# Patient Record
Sex: MALE | Race: OTHER | HISPANIC OR LATINO | URBAN - METROPOLITAN AREA
[De-identification: names, ages, dates, MRNs, and addresses within clinical notes are randomized per-mention and may not be internally consistent; named-entity substitution may affect disease eponyms.]

---

## 2019-08-13 ENCOUNTER — EMERGENCY (EMERGENCY)
Facility: HOSPITAL | Age: 39
LOS: 1 days | Discharge: HOME | End: 2019-08-13
Attending: EMERGENCY MEDICINE | Admitting: EMERGENCY MEDICINE
Payer: MEDICAID

## 2019-08-13 VITALS
SYSTOLIC BLOOD PRESSURE: 149 MMHG | OXYGEN SATURATION: 99 % | RESPIRATION RATE: 16 BRPM | DIASTOLIC BLOOD PRESSURE: 83 MMHG | HEART RATE: 84 BPM | TEMPERATURE: 98 F

## 2019-08-13 PROCEDURE — 99283 EMERGENCY DEPT VISIT LOW MDM: CPT

## 2019-08-13 PROCEDURE — 73140 X-RAY EXAM OF FINGER(S): CPT | Mod: 26,LT

## 2019-08-13 NOTE — ED ADULT TRIAGE NOTE - CHIEF COMPLAINT QUOTE
Patient c/o laceration to left 2nd finger. Patient reports he cut the finger while using a wood saw. Denies any blood thinners. Denies any PMHx. Patient last took advil around 8pm.

## 2019-08-13 NOTE — ED PROVIDER NOTE - OBJECTIVE STATEMENT
40 y/o male no PMH presents to ED c/o left finger laceration. Pt. is [place] speaking - pacific  #[id number] - states was at work at 5pm - cutting wood with machine/saw when it slipped causing laceration/injury to pad of left index finer. Pt. c/o pain and bleeding to area. Denies numbness tingling swelling.   States up to date on all vaccines. 38 y/o male no PMH presents to ED c/o left finger laceration. Pt. is American speaking - pacific  #[id number] - states was at work at 5pm - cutting wood with machine/saw when it slipped causing laceration/injury to pad of left index finer. Pt. c/o pain and bleeding to area. Denies numbness tingling swelling.   States up to date on all vaccines.    Attendinyo male presents with lac to the fingerpad of the left index finger with saw at work

## 2019-08-13 NOTE — ED PROVIDER NOTE - CLINICAL SUMMARY MEDICAL DECISION MAKING FREE TEXT BOX
40 y/o male c/o left 2nd digit laceration  -xray - r/o bony injury  -possible sutures vs local wound care

## 2019-08-13 NOTE — ED PROVIDER NOTE - PHYSICAL EXAMINATION
Gen: Well appearing in NAD  Head: NC/AT  Neck: trachea midline  Resp:  No distress  Ext: no deformities  Neuro:  A&O appears non focal  Skin:  see below  Psych:  Normal affect and mood     left index finger: +laceration/skin avulsion with central skin flap noted and superficial surrounding abrased skin

## 2020-04-04 ENCOUNTER — EMERGENCY (EMERGENCY)
Facility: HOSPITAL | Age: 40
LOS: 1 days | Discharge: HOME | End: 2020-04-04
Attending: EMERGENCY MEDICINE | Admitting: EMERGENCY MEDICINE
Payer: MEDICAID

## 2020-04-04 VITALS
OXYGEN SATURATION: 94 % | SYSTOLIC BLOOD PRESSURE: 152 MMHG | TEMPERATURE: 100 F | DIASTOLIC BLOOD PRESSURE: 94 MMHG | HEART RATE: 116 BPM | RESPIRATION RATE: 24 BRPM

## 2020-04-04 PROBLEM — Z78.9 OTHER SPECIFIED HEALTH STATUS: Chronic | Status: ACTIVE | Noted: 2019-08-13

## 2020-04-04 PROCEDURE — 99284 EMERGENCY DEPT VISIT MOD MDM: CPT

## 2020-04-04 RX ORDER — ALBUTEROL 90 UG/1
1 AEROSOL, METERED ORAL ONCE
Refills: 0 | Status: COMPLETED | OUTPATIENT
Start: 2020-04-04 | End: 2020-04-04

## 2020-04-04 RX ORDER — ALBUTEROL 90 UG/1
1 AEROSOL, METERED ORAL ONCE
Refills: 0 | Status: COMPLETED | OUTPATIENT
Start: 2020-04-04 | End: 2021-03-03

## 2020-04-04 RX ORDER — ACETAMINOPHEN 500 MG
975 TABLET ORAL ONCE
Refills: 0 | Status: COMPLETED | OUTPATIENT
Start: 2020-04-04 | End: 2020-04-04

## 2020-04-04 RX ADMIN — ALBUTEROL 1 PUFF(S): 90 AEROSOL, METERED ORAL at 13:39

## 2020-04-04 RX ADMIN — Medication 975 MILLIGRAM(S): at 13:38

## 2020-04-04 NOTE — ED PROVIDER NOTE - CLINICAL SUMMARY MEDICAL DECISION MAKING FREE TEXT BOX
41 y/o M with COVID-19 like symptoms. Speaking full sentences, no hypoxia will DC home with albuterol and outpatient follow up.

## 2020-04-04 NOTE — ED PROVIDER NOTE - PATIENT PORTAL LINK FT
You can access the FollowMyHealth Patient Portal offered by [facility] by registering at the following website: [email or web address]. By joining [facility]’s FollowMyHealth portal, you will also be able to view your health information using other applications (apps) compatible with our system.

## 2020-04-04 NOTE — ED PROVIDER NOTE - OBJECTIVE STATEMENT
39 y/o M with no significant PMHx presents to the ED c/o 5 days of fever. Taking Tylenol every 6 hours with relief to fevers. Denies SOB but admits to chest pressure. Mother with similar symptoms who has been sick for one and longer than him. Speaking full sentences. Nonsmoker, nondrinker.

## 2020-04-04 NOTE — ED ADULT TRIAGE NOTE - CHIEF COMPLAINT QUOTE
Complaining of fevers x 1 week. Daughter states he has been taking Tylenol every 6 hours. Last night began having sob so decided to go to hospital.

## 2020-04-08 ENCOUNTER — INPATIENT (INPATIENT)
Facility: HOSPITAL | Age: 40
LOS: 20 days | Discharge: HOME | End: 2020-04-29
Attending: INTERNAL MEDICINE | Admitting: INTERNAL MEDICINE
Payer: MEDICAID

## 2020-04-08 VITALS
RESPIRATION RATE: 20 BRPM | DIASTOLIC BLOOD PRESSURE: 90 MMHG | TEMPERATURE: 98 F | HEART RATE: 125 BPM | SYSTOLIC BLOOD PRESSURE: 144 MMHG | OXYGEN SATURATION: 84 %

## 2020-04-08 RX ORDER — ACETAMINOPHEN 500 MG
650 TABLET ORAL ONCE
Refills: 0 | Status: COMPLETED | OUTPATIENT
Start: 2020-04-08 | End: 2020-04-08

## 2020-04-08 RX ORDER — ALBUTEROL 90 UG/1
2 AEROSOL, METERED ORAL EVERY 6 HOURS
Refills: 0 | Status: DISCONTINUED | OUTPATIENT
Start: 2020-04-08 | End: 2020-04-10

## 2020-04-08 RX ADMIN — Medication 650 MILLIGRAM(S): at 23:47

## 2020-04-08 RX ADMIN — ALBUTEROL 2 PUFF(S): 90 AEROSOL, METERED ORAL at 23:47

## 2020-04-08 NOTE — ED PROVIDER NOTE - CARE PLAN
Principal Discharge DX:	SOB (shortness of breath)  Secondary Diagnosis:	Fever  Secondary Diagnosis:	Cough

## 2020-04-08 NOTE — ED PROVIDER NOTE - OBJECTIVE STATEMENT
[id number]:  40M no PMH p/w Fever cough and shortness of breath for several days.  Was seen 4 days ago for similar and was able to be d/c.  In total has 8 days of symptoms. SpO2 at home was 76 per triage note.  Here despite 10LNC and NRB pt satting low 80's.  However pt proned and satting mid 90's.  Patient denies chest pain, abd pain, N/V/D/C, weakness, HA, dizziness, urinary symptoms, extremity pain or swelling or other complaints. Not travel or sick contact.

## 2020-04-08 NOTE — ED PROVIDER NOTE - CLINICAL SUMMARY MEDICAL DECISION MAKING FREE TEXT BOX
40M no PMH p/w Fever cough and shortness of breath for several days.  Was seen 4 days ago for similar and was able to be d/c.  In total has 8 days of symptoms. SpO2 at home was 76 per triage note.  Here despite 10LNC and NRB pt satting low 80's.  However pt proned and satting mid 90's.  Work of breathing okay. Able to speak.  Normal mental status.  Likely coronavirus.  Admit given hypoxia.

## 2020-04-08 NOTE — ED PROVIDER NOTE - PHYSICAL EXAMINATION
GEN: ,well nourished, in no apparent distress.  HEAD: NCAT  HEENT: PERRL, Airway patent, EOMI, non-erythematous pharynx, no exudates, uvula midline, MMM, neck supple, no LAD, no JVD  LUNG: CTAB, +retractions +98% on supp O2 and proning   CV: RRR, no murmurs,   Abd: soft, NTND, no rebound or guarding, BS+ in all quadrants, no CVAT  MSK: WWP, Pulses 2+ in extremities, No edema   Neuro:  AAOx3, Ambulatory with stable gait.  Skin: Warm and dry, no evidence of rash  Psych: normal mood and affect

## 2020-04-08 NOTE — ED PROVIDER NOTE - ATTENDING CONTRIBUTION TO CARE
40M p/w Fever cough and short of breath for several days.  Was seen 4 days ago for similar and was able to be d/c.  So here 8 days of sx.  SpO2 at home was 76 per triage note.  Here despite 10LNC and NRB pt satting low 80's.  However pt proned and satting mid 90's.  Work of breathing okay. Able to speak.  Normal mental status.  Likely coronavirus.  Admit given hypoxia.  PMHX none PSHX - denies  meds - none No T.  All - NKA. 40M p/w Fever cough and short of breath for several days.  Was seen 4 days ago for similar and was able to be d/c.  So here 8 days of sx.  SpO2 at home was 76 per triage note.  Here despite 10LNC and NRB pt satting low 80's.  However pt proned and satting mid 90's.  Work of breathing okay. Able to speak.  Normal mental status.  Likely coronavirus.  Admit given hypoxia.  PMHX none PSHX - denies  meds - none No T.  All - NKA.  VS:  hypoxia, tachycardia    GEN - mod resp distress, malaise;   A+O x3   HEAD - NC/AT     ENT - PEERL, EOMI, mucous membranes   dry , no discharge      NECK: Neck supple, non-tender without lymphadenopathy, no masses, no JVD  PULM - fine crackles bilat, symmetric breath sounds  COR -  normal heart sounds    ABD - , ND, NT, soft,  BACK - no CVA tenderness, nontender spine     EXTREMS - no edema, no deformity, warm and well perfused    SKIN - no rash    or bruising      NEUROLOGIC - alert, face symmetric, speech fluent, sensation nl, motor no focal deficit.

## 2020-04-08 NOTE — ED PROVIDER NOTE - MDM PATIENT STATEMENT FOR ADDL TREATMENT
Patient with one or more new problems requiring additional work-up/treatment.
Class I (easy) - visualization of the soft palate, fauces, uvula, and both anterior and posterior pillars

## 2020-04-08 NOTE — ED ADULT TRIAGE NOTE - CHIEF COMPLAINT QUOTE
Pt brought in by daughter who stated he was in the ER two days ago for flu like symptoms, was sent home, no swab done. She states he has continued to decompensate at home. States O2 at home was 76%. Pt was 76% RA here. 84% on 15L nonrebreather. Pt does not appear to be in respiratory distress. Charge RN notified and brought directly to 28

## 2020-04-09 DIAGNOSIS — J96.01 ACUTE RESPIRATORY FAILURE WITH HYPOXIA: ICD-10-CM

## 2020-04-09 DIAGNOSIS — R06.03 ACUTE RESPIRATORY DISTRESS: ICD-10-CM

## 2020-04-09 DIAGNOSIS — Z02.9 ENCOUNTER FOR ADMINISTRATIVE EXAMINATIONS, UNSPECIFIED: ICD-10-CM

## 2020-04-09 LAB
ALBUMIN SERPL ELPH-MCNC: 3.1 G/DL — LOW (ref 3.3–5)
ALP SERPL-CCNC: 143 U/L — HIGH (ref 40–120)
ALT FLD-CCNC: 117 U/L — HIGH (ref 4–41)
ANION GAP SERPL CALC-SCNC: 13 MMO/L — SIGNIFICANT CHANGE UP (ref 7–14)
ANISOCYTOSIS BLD QL: SLIGHT — SIGNIFICANT CHANGE UP
AST SERPL-CCNC: 64 U/L — HIGH (ref 4–40)
BASE EXCESS BLDA CALC-SCNC: 5.3 MMOL/L — SIGNIFICANT CHANGE UP
BASOPHILS # BLD AUTO: 0.2 K/UL — SIGNIFICANT CHANGE UP (ref 0–0.2)
BASOPHILS NFR BLD AUTO: 1.1 % — SIGNIFICANT CHANGE UP (ref 0–2)
BASOPHILS NFR SPEC: 0 % — SIGNIFICANT CHANGE UP (ref 0–2)
BILIRUB SERPL-MCNC: 0.7 MG/DL — SIGNIFICANT CHANGE UP (ref 0.2–1.2)
BLASTS # FLD: 0 % — SIGNIFICANT CHANGE UP (ref 0–0)
BLOOD GAS ARTERIAL - FIO2: 100 — SIGNIFICANT CHANGE UP
BUN SERPL-MCNC: 15 MG/DL — SIGNIFICANT CHANGE UP (ref 7–23)
CALCIUM SERPL-MCNC: 8.6 MG/DL — SIGNIFICANT CHANGE UP (ref 8.4–10.5)
CHLORIDE SERPL-SCNC: 91 MMOL/L — LOW (ref 98–107)
CO2 SERPL-SCNC: 26 MMOL/L — SIGNIFICANT CHANGE UP (ref 22–31)
CREAT SERPL-MCNC: 0.89 MG/DL — SIGNIFICANT CHANGE UP (ref 0.5–1.3)
CRP SERPL HS-MCNC: 222.8 MG/L — SIGNIFICANT CHANGE UP
D DIMER BLD IA.RAPID-MCNC: 623 NG/ML — SIGNIFICANT CHANGE UP
EOSINOPHIL # BLD AUTO: 0.07 K/UL — SIGNIFICANT CHANGE UP (ref 0–0.5)
EOSINOPHIL NFR BLD AUTO: 0.4 % — SIGNIFICANT CHANGE UP (ref 0–6)
EOSINOPHIL NFR FLD: 0 % — SIGNIFICANT CHANGE UP (ref 0–6)
FERRITIN SERPL-MCNC: 2391 NG/ML — HIGH (ref 30–400)
GIANT PLATELETS BLD QL SMEAR: PRESENT — SIGNIFICANT CHANGE UP
GLUCOSE SERPL-MCNC: 96 MG/DL — SIGNIFICANT CHANGE UP (ref 70–99)
HCO3 BLDA-SCNC: 29 MMOL/L — HIGH (ref 22–26)
HCT VFR BLD CALC: 47.8 % — SIGNIFICANT CHANGE UP (ref 39–50)
HGB BLD-MCNC: 16.3 G/DL — SIGNIFICANT CHANGE UP (ref 13–17)
IMM GRANULOCYTES NFR BLD AUTO: 8 % — HIGH (ref 0–1.5)
LYMPHOCYTES # BLD AUTO: 1.99 K/UL — SIGNIFICANT CHANGE UP (ref 1–3.3)
LYMPHOCYTES # BLD AUTO: 11.4 % — LOW (ref 13–44)
LYMPHOCYTES NFR SPEC AUTO: 2.9 % — LOW (ref 13–44)
MCHC RBC-ENTMCNC: 29.3 PG — SIGNIFICANT CHANGE UP (ref 27–34)
MCHC RBC-ENTMCNC: 34.1 % — SIGNIFICANT CHANGE UP (ref 32–36)
MCV RBC AUTO: 86 FL — SIGNIFICANT CHANGE UP (ref 80–100)
METAMYELOCYTES # FLD: 0 % — SIGNIFICANT CHANGE UP (ref 0–1)
MONOCYTES # BLD AUTO: 1.37 K/UL — HIGH (ref 0–0.9)
MONOCYTES NFR BLD AUTO: 7.8 % — SIGNIFICANT CHANGE UP (ref 2–14)
MONOCYTES NFR BLD: 3.8 % — SIGNIFICANT CHANGE UP (ref 2–9)
MYELOCYTES NFR BLD: 0 % — SIGNIFICANT CHANGE UP (ref 0–0)
NEUTROPHIL AB SER-ACNC: 87.6 % — HIGH (ref 43–77)
NEUTROPHILS # BLD AUTO: 12.44 K/UL — HIGH (ref 1.8–7.4)
NEUTROPHILS NFR BLD AUTO: 71.3 % — SIGNIFICANT CHANGE UP (ref 43–77)
NEUTS BAND # BLD: 4.8 % — SIGNIFICANT CHANGE UP (ref 0–6)
NRBC # FLD: 0.13 K/UL — SIGNIFICANT CHANGE UP (ref 0–0)
OTHER - HEMATOLOGY %: 0 — SIGNIFICANT CHANGE UP
PCO2 BLDA: 40 MMHG — SIGNIFICANT CHANGE UP (ref 35–48)
PH BLDA: 7.48 PH — HIGH (ref 7.35–7.45)
PLATELET # BLD AUTO: 461 K/UL — HIGH (ref 150–400)
PLATELET COUNT - ESTIMATE: NORMAL — SIGNIFICANT CHANGE UP
PMV BLD: 9.9 FL — SIGNIFICANT CHANGE UP (ref 7–13)
PO2 BLDA: 58 MMHG — LOW (ref 83–108)
POTASSIUM SERPL-MCNC: 4.3 MMOL/L — SIGNIFICANT CHANGE UP (ref 3.5–5.3)
POTASSIUM SERPL-SCNC: 4.3 MMOL/L — SIGNIFICANT CHANGE UP (ref 3.5–5.3)
PROMYELOCYTES # FLD: 0 % — SIGNIFICANT CHANGE UP (ref 0–0)
PROT SERPL-MCNC: 7.2 G/DL — SIGNIFICANT CHANGE UP (ref 6–8.3)
RBC # BLD: 5.56 M/UL — SIGNIFICANT CHANGE UP (ref 4.2–5.8)
RBC # FLD: 13.3 % — SIGNIFICANT CHANGE UP (ref 10.3–14.5)
SAO2 % BLDA: 91.2 % — LOW (ref 95–99)
SARS-COV-2 RNA SPEC QL NAA+PROBE: DETECTED
SMUDGE CELLS # BLD: PRESENT — SIGNIFICANT CHANGE UP
SODIUM SERPL-SCNC: 130 MMOL/L — LOW (ref 135–145)
TROPONIN T, HIGH SENSITIVITY: 7 NG/L — SIGNIFICANT CHANGE UP (ref ?–14)
VARIANT LYMPHS # BLD: 0 % — SIGNIFICANT CHANGE UP
WBC # BLD: 17.46 K/UL — HIGH (ref 3.8–10.5)
WBC # FLD AUTO: 17.46 K/UL — HIGH (ref 3.8–10.5)

## 2020-04-09 PROCEDURE — 93010 ELECTROCARDIOGRAM REPORT: CPT

## 2020-04-09 PROCEDURE — 71045 X-RAY EXAM CHEST 1 VIEW: CPT | Mod: 26

## 2020-04-09 PROCEDURE — 99233 SBSQ HOSP IP/OBS HIGH 50: CPT

## 2020-04-09 RX ORDER — ANAKINRA 100MG/0.67
100 SYRINGE (ML) SUBCUTANEOUS EVERY 6 HOURS
Refills: 0 | Status: DISCONTINUED | OUTPATIENT
Start: 2020-04-09 | End: 2020-04-09

## 2020-04-09 RX ORDER — HYDROXYCHLOROQUINE SULFATE 200 MG
200 TABLET ORAL EVERY 12 HOURS
Refills: 0 | Status: COMPLETED | OUTPATIENT
Start: 2020-04-10 | End: 2020-04-13

## 2020-04-09 RX ORDER — ENOXAPARIN SODIUM 100 MG/ML
40 INJECTION SUBCUTANEOUS
Refills: 0 | Status: DISCONTINUED | OUTPATIENT
Start: 2020-04-09 | End: 2020-04-20

## 2020-04-09 RX ORDER — ACETAMINOPHEN 500 MG
650 TABLET ORAL EVERY 4 HOURS
Refills: 0 | Status: DISCONTINUED | OUTPATIENT
Start: 2020-04-09 | End: 2020-04-29

## 2020-04-09 RX ORDER — HYDROXYCHLOROQUINE SULFATE 200 MG
400 TABLET ORAL EVERY 12 HOURS
Refills: 0 | Status: COMPLETED | OUTPATIENT
Start: 2020-04-09 | End: 2020-04-09

## 2020-04-09 RX ORDER — ANAKINRA 100MG/0.67
100 SYRINGE (ML) SUBCUTANEOUS EVERY 6 HOURS
Refills: 0 | Status: COMPLETED | OUTPATIENT
Start: 2020-04-09 | End: 2020-04-12

## 2020-04-09 RX ORDER — HYDROXYCHLOROQUINE SULFATE 200 MG
TABLET ORAL
Refills: 0 | Status: COMPLETED | OUTPATIENT
Start: 2020-04-09 | End: 2020-04-13

## 2020-04-09 RX ORDER — ENOXAPARIN SODIUM 100 MG/ML
40 INJECTION SUBCUTANEOUS DAILY
Refills: 0 | Status: DISCONTINUED | OUTPATIENT
Start: 2020-04-09 | End: 2020-04-09

## 2020-04-09 RX ADMIN — Medication 400 MILLIGRAM(S): at 08:41

## 2020-04-09 RX ADMIN — Medication 100 MILLIGRAM(S): at 01:16

## 2020-04-09 RX ADMIN — Medication 40 MILLIGRAM(S): at 17:29

## 2020-04-09 RX ADMIN — Medication 40 MILLIGRAM(S): at 01:16

## 2020-04-09 RX ADMIN — Medication 400 MILLIGRAM(S): at 17:30

## 2020-04-09 RX ADMIN — Medication 100 MILLIGRAM(S): at 17:30

## 2020-04-09 RX ADMIN — Medication 100 MILLIGRAM(S): at 13:14

## 2020-04-09 RX ADMIN — ENOXAPARIN SODIUM 40 MILLIGRAM(S): 100 INJECTION SUBCUTANEOUS at 13:14

## 2020-04-09 NOTE — H&P ADULT - ASSESSMENT
41 y/o M with no pmh p/w worsening dyspnea, cough, and fever with Hypoxia and findings suspicious for viral pneumonia on imaging.

## 2020-04-09 NOTE — PROGRESS NOTE ADULT - SUBJECTIVE AND OBJECTIVE BOX
[facility] Division of Hospital Medicine  Cheryl Montes MD  Pager (M-F, 8A-5P):  In-house pager [phone]; Long-range pager [phone]  Other Times:  Please page Hospitalist in Charge -  In-house pager [phone]    Patient is a 40y old  Male who presents with a chief complaint of Dyspnea (09 Apr 2020 06:33)      SUBJECTIVE / OVERNIGHT EVENTS:  On max NC and NRB.    ADDITIONAL REVIEW OF SYSTEMS:    MEDICATIONS  (STANDING):  anakinra Injectable 100 milliGRAM(s) SubCutaneous every 6 hours  enoxaparin Injectable 40 milliGRAM(s) SubCutaneous daily  hydroxychloroquine   Oral   hydroxychloroquine 400 milliGRAM(s) Oral every 12 hours  methylPREDNISolone sodium succinate Injectable 40 milliGRAM(s) IV Push two times a day    MEDICATIONS  (PRN):  acetaminophen   Tablet .. 650 milliGRAM(s) Oral every 4 hours PRN Temp greater or equal to 38.5C (101.3F)  ALBUTerol    90 MICROgram(s) HFA Inhaler 2 Puff(s) Inhalation every 6 hours PRN Shortness of Breath  benzonatate 100 milliGRAM(s) Oral three times a day PRN Cough      CAPILLARY BLOOD GLUCOSE        I&O's Summary      PHYSICAL EXAM:  Vital Signs Last 24 Hrs  T(C): 36.8 (09 Apr 2020 08:20), Max: 37.4 (09 Apr 2020 06:11)  T(F): 98.2 (09 Apr 2020 08:20), Max: 99.3 (09 Apr 2020 06:11)  HR: 84 (09 Apr 2020 08:20) (84 - 125)  BP: 127/83 (09 Apr 2020 08:20) (127/83 - 148/84)  BP(mean): --  RR: 30 (09 Apr 2020 08:20) (20 - 38)  SpO2: 90% (09 Apr 2020 08:20) (84% - 97%)    CONSTITUTIONAL: resting in bed, NRB  EYES: PERRLA; conjunctiva and sclera clear  RESPIRATORY: relatively clear  CARDIOVASCULAR: Regular rate and rhythm, normal S1 and S2  ABDOMEN: Nontender to palpation, normoactive bowel sounds, no rebound/guarding  MUSCLOSKELETAL:  Normal gait; no clubbing or cyanosis of digits; no joint swelling or tenderness to palpation  PSYCH: A+O to person, place, and time; affect appropriate  NEUROLOGY: CN 2-12 are intact and symmetric; no gross sensory deficits;   SKIN: No rashes; no palpable lesions    LABS:                        16.3   17.46 )-----------( 461      ( 08 Apr 2020 23:47 )             47.8     04-08    130<L>  |  91<L>  |  15  ----------------------------<  96  4.3   |  26  |  0.89    Ca    8.6      08 Apr 2020 23:30    TPro  7.2  /  Alb  3.1<L>  /  TBili  0.7  /  DBili  x   /  AST  64<H>  /  ALT  117<H>  /  AlkPhos  143<H>  04-08                RADIOLOGY & ADDITIONAL TESTS:  Results Reviewed:   Imaging Personally Reviewed:  Electrocardiogram Personally Reviewed:    COORDINATION OF CARE:  Care Discussed with Consultants/Other Providers [Y/N]: RN, SW, CM, ACP re overall care   Prior or Outpatient Records Reviewed [Y/N]: [facility] Division of Hospital Medicine  Cheryl Montes MD  Pager (M-F, 8A-5P):  In-house pager [phone]; Long-range pager [phone]  Other Times:  Please page Hospitalist in Charge -  In-house pager [phone]    Patient is a 40y old  Male who presents with a chief complaint of Dyspnea (09 Apr 2020 06:33)      SUBJECTIVE / OVERNIGHT EVENTS:  On max NC and NRB, RRT called, proned/chest PT, sats improved to 90s.    ADDITIONAL REVIEW OF SYSTEMS:    MEDICATIONS  (STANDING):  anakinra Injectable 100 milliGRAM(s) SubCutaneous every 6 hours  enoxaparin Injectable 40 milliGRAM(s) SubCutaneous daily  hydroxychloroquine   Oral   hydroxychloroquine 400 milliGRAM(s) Oral every 12 hours  methylPREDNISolone sodium succinate Injectable 40 milliGRAM(s) IV Push two times a day    MEDICATIONS  (PRN):  acetaminophen   Tablet .. 650 milliGRAM(s) Oral every 4 hours PRN Temp greater or equal to 38.5C (101.3F)  ALBUTerol    90 MICROgram(s) HFA Inhaler 2 Puff(s) Inhalation every 6 hours PRN Shortness of Breath  benzonatate 100 milliGRAM(s) Oral three times a day PRN Cough      CAPILLARY BLOOD GLUCOSE        I&O's Summary      PHYSICAL EXAM:  Vital Signs Last 24 Hrs  T(C): 36.8 (09 Apr 2020 08:20), Max: 37.4 (09 Apr 2020 06:11)  T(F): 98.2 (09 Apr 2020 08:20), Max: 99.3 (09 Apr 2020 06:11)  HR: 84 (09 Apr 2020 08:20) (84 - 125)  BP: 127/83 (09 Apr 2020 08:20) (127/83 - 148/84)  BP(mean): --  RR: 30 (09 Apr 2020 08:20) (20 - 38)  SpO2: 90% (09 Apr 2020 08:20) (84% - 97%)    CONSTITUTIONAL: resting in bed, NRB  EYES: PERRLA; conjunctiva and sclera clear  RESPIRATORY: relatively clear  CARDIOVASCULAR: Regular rate and rhythm, normal S1 and S2  ABDOMEN: Nontender to palpation, normoactive bowel sounds, no rebound/guarding  MUSCLOSKELETAL:  Normal gait; no clubbing or cyanosis of digits; no joint swelling or tenderness to palpation  PSYCH: A+O to person, place, and time; affect appropriate  NEUROLOGY: CN 2-12 are intact and symmetric; no gross sensory deficits;   SKIN: No rashes; no palpable lesions    LABS:                        16.3   17.46 )-----------( 461      ( 08 Apr 2020 23:47 )             47.8     04-08    130<L>  |  91<L>  |  15  ----------------------------<  96  4.3   |  26  |  0.89    Ca    8.6      08 Apr 2020 23:30    TPro  7.2  /  Alb  3.1<L>  /  TBili  0.7  /  DBili  x   /  AST  64<H>  /  ALT  117<H>  /  AlkPhos  143<H>  04-08                RADIOLOGY & ADDITIONAL TESTS:  Results Reviewed:   Imaging Personally Reviewed:  Electrocardiogram Personally Reviewed:    COORDINATION OF CARE:  Care Discussed with Consultants/Other Providers [Y/N]: RN, SW, CM, ACP re overall care   Prior or Outpatient Records Reviewed [Y/N]:

## 2020-04-09 NOTE — H&P ADULT - PROBLEM SELECTOR PLAN 1
Likely 2/2 COVID 19 infection  - Will continue steroids started in ED  - Start Plaquenil  - f/u results of COVID 19 PCR  - NRB with intermittent proning

## 2020-04-09 NOTE — H&P ADULT - NSHPPHYSICALEXAM_GEN_ALL_CORE
Observed by me:  Gen: NAD  Pulm: tachypnea  Psych: normal affect    From ED note:   GEN: ,well nourished, in no apparent distress.  	HEAD: NCAT  	HEENT: PERRL, Airway patent, EOMI, non-erythematous pharynx, no exudates, uvula midline, MMM, neck supple, no LAD, no JVD  	LUNG: CTAB, +retractions +98% on supp O2 and proning   	CV: RRR, no murmurs,   	Abd: soft, NTND, no rebound or guarding, BS+ in all quadrants, no CVAT  	MSK: WWP, Pulses 2+ in extremities, No edema   	Neuro:  AAOx3, Ambulatory with stable gait.  	Skin: Warm and dry, no evidence of rash  Psych: normal mood and affect

## 2020-04-09 NOTE — PROGRESS NOTE ADULT - ASSESSMENT
39 y/o M with no pmh p/w worsening dyspnea, cough, and fever with Hypoxia and findings suspicious for viral pneumonia on imaging. 39 y/o M with no pmh p/w worsening dyspnea, cough, and fever with Hypoxia and findings suspicious for viral pneumonia on imaging, found COVID+.  Acute hypoxic respiratory failure

## 2020-04-09 NOTE — ED ADULT NURSE REASSESSMENT NOTE - NS ED NURSE REASSESS COMMENT FT1
pt brought into room 28 a&ox4 ambulatory self care 40 yr old male presents to the ed today for shortness of breathe. pt 84%on NRB. PT breathing even and tachypneic but in no distress. pt placed on NRB and 10L NC prone and patient 02 saturation 97 percent. pt was seen in the ed for flu like symptoms and DC. 18g iv placed in right ac. medicated as ordered. NSR on cardiac monitor,.

## 2020-04-09 NOTE — H&P ADULT - NSHPLABSRESULTS_GEN_ALL_CORE
04-08    130<L>  |  91<L>  |  15  ----------------------------<  96  4.3   |  26  |  0.89    Ca    8.6      08 Apr 2020 23:30    TPro  7.2  /  Alb  3.1<L>  /  TBili  0.7  /  DBili  x   /  AST  64<H>  /  ALT  117<H>  /  AlkPhos  143<H>  04-08                            16.3   17.46 )-----------( 461      ( 08 Apr 2020 23:47 )             47.8       < from: Xray Chest 1 View- PORTABLE-Urgent (04.09.20 @ 00:58) >    Patchy/hazy bilateral opacities. Pattern suggests infection including atypical pneumonia/viral infection from atypical agents including COVID-19.    < end of copied text >

## 2020-04-09 NOTE — H&P ADULT - NSHPREVIEWOFSYSTEMS_GEN_ALL_CORE
Review of Systems:   CONSTITUTIONAL: fever, malaise  EYES: No eye pain, visual disturbances, or discharge  ENMT:  No difficulty hearing, no throat pain  NECK: No pain or stiffness  RESPIRATORY: cough, shortness of breath  CARDIOVASCULAR: No chest pain, palpitations, dizziness, or leg swelling  GASTROINTESTINAL: No abdominal pain, nausea, vomiting or diarrhea  GENITOURINARY: No dysuria, or hematuria  NEUROLOGICAL: No headaches, weakness, or numbness  SKIN: No rashes, or lesions   LYMPH NODES: No enlarged glands  ENDOCRINE: No heat or cold intolerance  MUSCULOSKELETAL: No joint pain or swelling  PSYCHIATRIC: No depression or anxiety  HEME/LYMPH: No easy bruising, or bleeding  ALLERGY AND IMMUNOLOGIC: No hives or eczema

## 2020-04-09 NOTE — H&P ADULT - HISTORY OF PRESENT ILLNESS
41 y/o M  with no PMH p/w fever, cough, and SOB.  Pt reports symptoms for the past week.  He presented to the ED 4 days ago, and was discharged home after eval with CXR.   He has been taking Tylenol for continued fevers. He now returns for worsening dyspnea.  Pt's daughter checked O2 sat at home which was 76%.  Pt reports his mother was sick with similar symptoms recently.      In the ED, pt had RA sat of 76%, and was placed on NRB with sat initially in 80's on NRB.  Sat eventually improved to 90's.  Pt received Solumedrol, Tylenol, and albuterol.

## 2020-04-09 NOTE — PROGRESS NOTE ADULT - PROBLEM SELECTOR PLAN 1
Likely 2/2 COVID 19 infection  - Will continue steroids started in ED  - Start Plaquenil  - f/u results of COVID 19 PCR  - NRB with intermittent proning 2/2 COVID 19 infection   - Plaquenil, steroids, Anakinra, proning  - titrate O2 as able  full code

## 2020-04-09 NOTE — RAPID RESPONSE TEAM SUMMARY - NSOTHERINTERVENTIONSRRT_GEN_ALL_CORE
Patient placed in prone position and O2 sat improved to 93-95%. Anesthesia arrived and viewed pulse ox reading prior to RRT end as well. Plan is to keep patient in prone position, c/w chest PT, and current medications (plaquenil, solumedrol, anakinra, lovenox for DVT ppx). Goal to keep SaO2 above 90 and avoid hyperoxia.

## 2020-04-09 NOTE — RAPID RESPONSE TEAM SUMMARY - NSADDTLFINDINGSRRT_GEN_ALL_CORE
Patient appeared to be calm, in no respiratory distress, speaking with attending via  phone in complete sentences. O2 Sat 87-88% on continuous pulse ox monitoring. HR 80s, SBP was in 120s.

## 2020-04-10 LAB
ALBUMIN SERPL ELPH-MCNC: 2.9 G/DL — LOW (ref 3.3–5)
ALP SERPL-CCNC: 117 U/L — SIGNIFICANT CHANGE UP (ref 40–120)
ALT FLD-CCNC: 77 U/L — HIGH (ref 4–41)
ANION GAP SERPL CALC-SCNC: 14 MMO/L — SIGNIFICANT CHANGE UP (ref 7–14)
AST SERPL-CCNC: 31 U/L — SIGNIFICANT CHANGE UP (ref 4–40)
BASOPHILS # BLD AUTO: 0.15 K/UL — SIGNIFICANT CHANGE UP (ref 0–0.2)
BASOPHILS NFR BLD AUTO: 0.8 % — SIGNIFICANT CHANGE UP (ref 0–2)
BILIRUB SERPL-MCNC: 0.5 MG/DL — SIGNIFICANT CHANGE UP (ref 0.2–1.2)
BUN SERPL-MCNC: 21 MG/DL — SIGNIFICANT CHANGE UP (ref 7–23)
CALCIUM SERPL-MCNC: 9.1 MG/DL — SIGNIFICANT CHANGE UP (ref 8.4–10.5)
CHLORIDE SERPL-SCNC: 95 MMOL/L — LOW (ref 98–107)
CO2 SERPL-SCNC: 27 MMOL/L — SIGNIFICANT CHANGE UP (ref 22–31)
CREAT SERPL-MCNC: 0.69 MG/DL — SIGNIFICANT CHANGE UP (ref 0.5–1.3)
CRP SERPL-MCNC: 117.9 MG/L — HIGH
D DIMER BLD IA.RAPID-MCNC: 459 NG/ML — SIGNIFICANT CHANGE UP
EOSINOPHIL # BLD AUTO: 0.02 K/UL — SIGNIFICANT CHANGE UP (ref 0–0.5)
EOSINOPHIL NFR BLD AUTO: 0.1 % — SIGNIFICANT CHANGE UP (ref 0–6)
FERRITIN SERPL-MCNC: 2127 NG/ML — HIGH (ref 30–400)
FERRITIN SERPL-MCNC: 2138 NG/ML — HIGH (ref 30–400)
GLUCOSE SERPL-MCNC: 116 MG/DL — HIGH (ref 70–99)
HCT VFR BLD CALC: 49.6 % — SIGNIFICANT CHANGE UP (ref 39–50)
HGB BLD-MCNC: 16.6 G/DL — SIGNIFICANT CHANGE UP (ref 13–17)
IMM GRANULOCYTES NFR BLD AUTO: 6.6 % — HIGH (ref 0–1.5)
LDH SERPL L TO P-CCNC: 531 U/L — HIGH (ref 135–225)
LYMPHOCYTES # BLD AUTO: 1.46 K/UL — SIGNIFICANT CHANGE UP (ref 1–3.3)
LYMPHOCYTES # BLD AUTO: 8.1 % — LOW (ref 13–44)
MAGNESIUM SERPL-MCNC: 2.4 MG/DL — SIGNIFICANT CHANGE UP (ref 1.6–2.6)
MCHC RBC-ENTMCNC: 29.2 PG — SIGNIFICANT CHANGE UP (ref 27–34)
MCHC RBC-ENTMCNC: 33.5 % — SIGNIFICANT CHANGE UP (ref 32–36)
MCV RBC AUTO: 87.2 FL — SIGNIFICANT CHANGE UP (ref 80–100)
MONOCYTES # BLD AUTO: 1.08 K/UL — HIGH (ref 0–0.9)
MONOCYTES NFR BLD AUTO: 6 % — SIGNIFICANT CHANGE UP (ref 2–14)
NEUTROPHILS # BLD AUTO: 14.18 K/UL — HIGH (ref 1.8–7.4)
NEUTROPHILS NFR BLD AUTO: 78.4 % — HIGH (ref 43–77)
NRBC # FLD: 0.02 K/UL — SIGNIFICANT CHANGE UP (ref 0–0)
PHOSPHATE SERPL-MCNC: 3.7 MG/DL — SIGNIFICANT CHANGE UP (ref 2.5–4.5)
PLATELET # BLD AUTO: 453 K/UL — HIGH (ref 150–400)
PMV BLD: 10 FL — SIGNIFICANT CHANGE UP (ref 7–13)
POTASSIUM SERPL-MCNC: 5 MMOL/L — SIGNIFICANT CHANGE UP (ref 3.5–5.3)
POTASSIUM SERPL-SCNC: 5 MMOL/L — SIGNIFICANT CHANGE UP (ref 3.5–5.3)
PROCALCITONIN SERPL-MCNC: 0.18 NG/ML — HIGH (ref 0.02–0.1)
PROT SERPL-MCNC: 7.5 G/DL — SIGNIFICANT CHANGE UP (ref 6–8.3)
RBC # BLD: 5.69 M/UL — SIGNIFICANT CHANGE UP (ref 4.2–5.8)
RBC # FLD: 13.5 % — SIGNIFICANT CHANGE UP (ref 10.3–14.5)
SODIUM SERPL-SCNC: 136 MMOL/L — SIGNIFICANT CHANGE UP (ref 135–145)
WBC # BLD: 18.09 K/UL — HIGH (ref 3.8–10.5)
WBC # FLD AUTO: 18.09 K/UL — HIGH (ref 3.8–10.5)

## 2020-04-10 PROCEDURE — 99233 SBSQ HOSP IP/OBS HIGH 50: CPT

## 2020-04-10 RX ORDER — ALBUTEROL 90 UG/1
2 AEROSOL, METERED ORAL EVERY 6 HOURS
Refills: 0 | Status: DISCONTINUED | OUTPATIENT
Start: 2020-04-10 | End: 2020-04-18

## 2020-04-10 RX ORDER — ACETAMINOPHEN 500 MG
1000 TABLET ORAL ONCE
Refills: 0 | Status: COMPLETED | OUTPATIENT
Start: 2020-04-10 | End: 2020-04-10

## 2020-04-10 RX ORDER — FUROSEMIDE 40 MG
20 TABLET ORAL ONCE
Refills: 0 | Status: COMPLETED | OUTPATIENT
Start: 2020-04-10 | End: 2020-04-10

## 2020-04-10 RX ADMIN — Medication 200 MILLIGRAM(S): at 18:24

## 2020-04-10 RX ADMIN — ENOXAPARIN SODIUM 40 MILLIGRAM(S): 100 INJECTION SUBCUTANEOUS at 18:24

## 2020-04-10 RX ADMIN — Medication 100 MILLIGRAM(S): at 23:24

## 2020-04-10 RX ADMIN — Medication 100 MILLIGRAM(S): at 12:54

## 2020-04-10 RX ADMIN — Medication 40 MILLIGRAM(S): at 05:43

## 2020-04-10 RX ADMIN — Medication 100 MILLIGRAM(S): at 05:42

## 2020-04-10 RX ADMIN — Medication 400 MILLIGRAM(S): at 21:34

## 2020-04-10 RX ADMIN — Medication 100 MILLIGRAM(S): at 18:25

## 2020-04-10 RX ADMIN — Medication 40 MILLIGRAM(S): at 18:24

## 2020-04-10 RX ADMIN — Medication 100 MILLIGRAM(S): at 00:19

## 2020-04-10 RX ADMIN — Medication 200 MILLIGRAM(S): at 08:02

## 2020-04-10 RX ADMIN — Medication 20 MILLIGRAM(S): at 12:54

## 2020-04-10 RX ADMIN — ENOXAPARIN SODIUM 40 MILLIGRAM(S): 100 INJECTION SUBCUTANEOUS at 05:42

## 2020-04-10 NOTE — PROGRESS NOTE ADULT - SUBJECTIVE AND OBJECTIVE BOX
[facility] Division of Hospital Medicine  Cheryl Montes MD  Pager (M-F, 8A-5P):  In-house pager [phone]; Long-range pager [phone]  Other Times:  Please page Hospitalist in Charge -  In-house pager [phone]    Patient is a 40y old  Male who presents with a chief complaint of Dyspnea (09 Apr 2020 12:39)      SUBJECTIVE / OVERNIGHT EVENTS:  ADDITIONAL REVIEW OF SYSTEMS:    MEDICATIONS  (STANDING):  anakinra Injectable 100 milliGRAM(s) SubCutaneous every 6 hours  enoxaparin Injectable 40 milliGRAM(s) SubCutaneous two times a day  hydroxychloroquine 200 milliGRAM(s) Oral every 12 hours  hydroxychloroquine   Oral   methylPREDNISolone sodium succinate Injectable 40 milliGRAM(s) IV Push two times a day    MEDICATIONS  (PRN):  acetaminophen   Tablet .. 650 milliGRAM(s) Oral every 4 hours PRN Temp greater or equal to 38.5C (101.3F)  ALBUTerol    90 MICROgram(s) HFA Inhaler 2 Puff(s) Inhalation every 6 hours PRN Shortness of Breath  benzonatate 100 milliGRAM(s) Oral three times a day PRN Cough      CAPILLARY BLOOD GLUCOSE        I&O's Summary    09 Apr 2020 07:01  -  10 Apr 2020 07:00  --------------------------------------------------------  IN: 360 mL / OUT: 400 mL / NET: -40 mL        PHYSICAL EXAM:  Vital Signs Last 24 Hrs  T(C): 36.3 (09 Apr 2020 20:10), Max: 36.5 (09 Apr 2020 17:52)  T(F): 97.4 (09 Apr 2020 20:10), Max: 97.7 (09 Apr 2020 17:52)  HR: 83 (09 Apr 2020 20:10) (83 - 92)  BP: 120/81 (09 Apr 2020 20:10) (111/76 - 124/88)  BP(mean): --  RR: 29 (09 Apr 2020 20:10) (28 - 29)  SpO2: 89% (09 Apr 2020 20:10) (89% - 90%)    CONSTITUTIONAL: proned, NRB  EYES: PERRLA; conjunctiva and sclera clear  RESPIRATORY: relatively clear  CARDIOVASCULAR: Regular rate and rhythm, normal S1 and S2  ABDOMEN: Nontender to palpation, normoactive bowel sounds, no rebound/guarding  MUSCLOSKELETAL:  Normal gait; no clubbing or cyanosis of digits; no joint swelling or tenderness to palpation  PSYCH: A+O to person, place, and time; affect appropriate  NEUROLOGY: CN 2-12 are intact and symmetric; no gross sensory deficits;   SKIN: No rashes; no palpable lesions    LABS:                        16.6   18.09 )-----------( 453      ( 10 Apr 2020 06:30 )             49.6     04-08    130<L>  |  91<L>  |  15  ----------------------------<  96  4.3   |  26  |  0.89    Ca    8.6      08 Apr 2020 23:30    TPro  7.2  /  Alb  3.1<L>  /  TBili  0.7  /  DBili  x   /  AST  64<H>  /  ALT  117<H>  /  AlkPhos  143<H>  04-08                RADIOLOGY & ADDITIONAL TESTS:  Results Reviewed:   Imaging Personally Reviewed:  Electrocardiogram Personally Reviewed:    COORDINATION OF CARE:  Care Discussed with Consultants/Other Providers [Y/N]: RN, SW, CM, ACP re overall care   Prior or Outpatient Records Reviewed [Y/N]: [facility] Division of Hospital Medicine  Cheryl Montes MD  Pager (M-F, 8A-5P):  In-house pager [phone]; Long-range pager [phone]  Other Times:  Please page Hospitalist in Charge -  In-house pager [phone]    Patient is a 40y old  Male who presents with a chief complaint of Dyspnea (09 Apr 2020 12:39)      SUBJECTIVE / OVERNIGHT EVENTS: Pt seen earlier, mentating well, no retractions, sats tenuous but improve with proning, chest PT.  ADDITIONAL REVIEW OF SYSTEMS:    MEDICATIONS  (STANDING):  anakinra Injectable 100 milliGRAM(s) SubCutaneous every 6 hours  enoxaparin Injectable 40 milliGRAM(s) SubCutaneous two times a day  hydroxychloroquine 200 milliGRAM(s) Oral every 12 hours  hydroxychloroquine   Oral   methylPREDNISolone sodium succinate Injectable 40 milliGRAM(s) IV Push two times a day    MEDICATIONS  (PRN):  acetaminophen   Tablet .. 650 milliGRAM(s) Oral every 4 hours PRN Temp greater or equal to 38.5C (101.3F)  ALBUTerol    90 MICROgram(s) HFA Inhaler 2 Puff(s) Inhalation every 6 hours PRN Shortness of Breath  benzonatate 100 milliGRAM(s) Oral three times a day PRN Cough      CAPILLARY BLOOD GLUCOSE        I&O's Summary    09 Apr 2020 07:01  -  10 Apr 2020 07:00  --------------------------------------------------------  IN: 360 mL / OUT: 400 mL / NET: -40 mL        PHYSICAL EXAM:  Vital Signs Last 24 Hrs  T(C): 36.3 (09 Apr 2020 20:10), Max: 36.5 (09 Apr 2020 17:52)  T(F): 97.4 (09 Apr 2020 20:10), Max: 97.7 (09 Apr 2020 17:52)  HR: 83 (09 Apr 2020 20:10) (83 - 92)  BP: 120/81 (09 Apr 2020 20:10) (111/76 - 124/88)  BP(mean): --  RR: 29 (09 Apr 2020 20:10) (28 - 29)  SpO2: 89% (09 Apr 2020 20:10) (89% - 90%)    CONSTITUTIONAL: proned, NRB  EYES: PERRLA; conjunctiva and sclera clear  RESPIRATORY: relatively clear  CARDIOVASCULAR: Regular rate and rhythm, normal S1 and S2  ABDOMEN: Nontender to palpation, normoactive bowel sounds, no rebound/guarding  MUSCLOSKELETAL:  Normal gait; no clubbing or cyanosis of digits; no joint swelling or tenderness to palpation  PSYCH: A+O to person, place, and time; affect appropriate  NEUROLOGY: CN 2-12 are intact and symmetric; no gross sensory deficits;   SKIN: No rashes; no palpable lesions    LABS:                        16.6   18.09 )-----------( 453      ( 10 Apr 2020 06:30 )             49.6     04-08    130<L>  |  91<L>  |  15  ----------------------------<  96  4.3   |  26  |  0.89    Ca    8.6      08 Apr 2020 23:30    TPro  7.2  /  Alb  3.1<L>  /  TBili  0.7  /  DBili  x   /  AST  64<H>  /  ALT  117<H>  /  AlkPhos  143<H>  04-08                RADIOLOGY & ADDITIONAL TESTS:  Results Reviewed:   Imaging Personally Reviewed:  Electrocardiogram Personally Reviewed:    COORDINATION OF CARE:  Care Discussed with Consultants/Other Providers [Y/N]: RN, SW, CM, ACP re overall care   Prior or Outpatient Records Reviewed [Y/N]:

## 2020-04-10 NOTE — PROGRESS NOTE ADULT - PROBLEM SELECTOR PLAN 1
2/2 COVID 19 infection   - Plaquenil, steroids, Anakinra, proning  - titrate O2 as able  full code 2/2 COVID 19 infection   - Plaquenil, steroids, Anakinra, proning, chest PT/IS/albuterol, titrate O2 as able  full code 2/2 COVID 19 infection   - Plaquenil, steroids, Anakinra, proning, chest PT/IS/albuterol, titrate O2 as able  full code  - dose lasix 20IV x1

## 2020-04-10 NOTE — PROGRESS NOTE ADULT - ASSESSMENT
41 y/o M with no pmh p/w worsening dyspnea, cough, and fever with Hypoxia and findings suspicious for viral pneumonia on imaging, found COVID+.  Acute hypoxic respiratory failure

## 2020-04-10 NOTE — CHART NOTE - NSCHARTNOTEFT_GEN_A_CORE
+ for Covid -19 - on Plaquenil   Placed BioTel patch for monitoring of ventricular arrhythmias and QTc monitoring     Thank you.   Alesia Denise,FNP-C  [id number].

## 2020-04-10 NOTE — PROGRESS NOTE ADULT - ATTENDING COMMENTS
d/w [place] [id number] [phone] d/w ronny Fajardo [phone] 4/9, 4/10.  They are taking care of pt's 5yo daughter.

## 2020-04-11 DIAGNOSIS — A41.9 SEPSIS, UNSPECIFIED ORGANISM: ICD-10-CM

## 2020-04-11 LAB
ALBUMIN SERPL ELPH-MCNC: 3.3 G/DL — SIGNIFICANT CHANGE UP (ref 3.3–5)
ALP SERPL-CCNC: 106 U/L — SIGNIFICANT CHANGE UP (ref 40–120)
ALT FLD-CCNC: 79 U/L — HIGH (ref 4–41)
ANION GAP SERPL CALC-SCNC: 12 MMO/L — SIGNIFICANT CHANGE UP (ref 7–14)
AST SERPL-CCNC: 29 U/L — SIGNIFICANT CHANGE UP (ref 4–40)
BASOPHILS # BLD AUTO: 0.09 K/UL — SIGNIFICANT CHANGE UP (ref 0–0.2)
BASOPHILS NFR BLD AUTO: 0.6 % — SIGNIFICANT CHANGE UP (ref 0–2)
BILIRUB SERPL-MCNC: 0.4 MG/DL — SIGNIFICANT CHANGE UP (ref 0.2–1.2)
BUN SERPL-MCNC: 27 MG/DL — HIGH (ref 7–23)
CALCIUM SERPL-MCNC: 8.9 MG/DL — SIGNIFICANT CHANGE UP (ref 8.4–10.5)
CHLORIDE SERPL-SCNC: 95 MMOL/L — LOW (ref 98–107)
CO2 SERPL-SCNC: 25 MMOL/L — SIGNIFICANT CHANGE UP (ref 22–31)
CREAT SERPL-MCNC: 0.74 MG/DL — SIGNIFICANT CHANGE UP (ref 0.5–1.3)
EOSINOPHIL # BLD AUTO: 0.03 K/UL — SIGNIFICANT CHANGE UP (ref 0–0.5)
EOSINOPHIL NFR BLD AUTO: 0.2 % — SIGNIFICANT CHANGE UP (ref 0–6)
GLUCOSE SERPL-MCNC: 125 MG/DL — HIGH (ref 70–99)
HCT VFR BLD CALC: 51.9 % — HIGH (ref 39–50)
HGB BLD-MCNC: 17.3 G/DL — HIGH (ref 13–17)
IMM GRANULOCYTES NFR BLD AUTO: 4.8 % — HIGH (ref 0–1.5)
LYMPHOCYTES # BLD AUTO: 1.4 K/UL — SIGNIFICANT CHANGE UP (ref 1–3.3)
LYMPHOCYTES # BLD AUTO: 9.3 % — LOW (ref 13–44)
MAGNESIUM SERPL-MCNC: 2.6 MG/DL — SIGNIFICANT CHANGE UP (ref 1.6–2.6)
MCHC RBC-ENTMCNC: 29.2 PG — SIGNIFICANT CHANGE UP (ref 27–34)
MCHC RBC-ENTMCNC: 33.3 % — SIGNIFICANT CHANGE UP (ref 32–36)
MCV RBC AUTO: 87.5 FL — SIGNIFICANT CHANGE UP (ref 80–100)
MONOCYTES # BLD AUTO: 0.88 K/UL — SIGNIFICANT CHANGE UP (ref 0–0.9)
MONOCYTES NFR BLD AUTO: 5.9 % — SIGNIFICANT CHANGE UP (ref 2–14)
NEUTROPHILS # BLD AUTO: 11.88 K/UL — HIGH (ref 1.8–7.4)
NEUTROPHILS NFR BLD AUTO: 79.2 % — HIGH (ref 43–77)
NRBC # FLD: 0 K/UL — SIGNIFICANT CHANGE UP (ref 0–0)
PHOSPHATE SERPL-MCNC: 3.6 MG/DL — SIGNIFICANT CHANGE UP (ref 2.5–4.5)
PLATELET # BLD AUTO: 525 K/UL — HIGH (ref 150–400)
PMV BLD: 9.7 FL — SIGNIFICANT CHANGE UP (ref 7–13)
POTASSIUM SERPL-MCNC: 4.8 MMOL/L — SIGNIFICANT CHANGE UP (ref 3.5–5.3)
POTASSIUM SERPL-SCNC: 4.8 MMOL/L — SIGNIFICANT CHANGE UP (ref 3.5–5.3)
PROT SERPL-MCNC: 7.3 G/DL — SIGNIFICANT CHANGE UP (ref 6–8.3)
RBC # BLD: 5.93 M/UL — HIGH (ref 4.2–5.8)
RBC # FLD: 13.3 % — SIGNIFICANT CHANGE UP (ref 10.3–14.5)
SODIUM SERPL-SCNC: 132 MMOL/L — LOW (ref 135–145)
WBC # BLD: 15 K/UL — HIGH (ref 3.8–10.5)
WBC # FLD AUTO: 15 K/UL — HIGH (ref 3.8–10.5)

## 2020-04-11 PROCEDURE — 99233 SBSQ HOSP IP/OBS HIGH 50: CPT

## 2020-04-11 RX ADMIN — ALBUTEROL 2 PUFF(S): 90 AEROSOL, METERED ORAL at 05:27

## 2020-04-11 RX ADMIN — Medication 200 MILLIGRAM(S): at 05:44

## 2020-04-11 RX ADMIN — Medication 40 MILLIGRAM(S): at 05:43

## 2020-04-11 RX ADMIN — Medication 40 MILLIGRAM(S): at 18:11

## 2020-04-11 RX ADMIN — Medication 100 MILLIGRAM(S): at 05:44

## 2020-04-11 RX ADMIN — ALBUTEROL 2 PUFF(S): 90 AEROSOL, METERED ORAL at 11:35

## 2020-04-11 RX ADMIN — ALBUTEROL 2 PUFF(S): 90 AEROSOL, METERED ORAL at 00:10

## 2020-04-11 RX ADMIN — ENOXAPARIN SODIUM 40 MILLIGRAM(S): 100 INJECTION SUBCUTANEOUS at 18:10

## 2020-04-11 RX ADMIN — ENOXAPARIN SODIUM 40 MILLIGRAM(S): 100 INJECTION SUBCUTANEOUS at 05:44

## 2020-04-11 RX ADMIN — Medication 100 MILLIGRAM(S): at 12:39

## 2020-04-11 RX ADMIN — Medication 100 MILLIGRAM(S): at 18:10

## 2020-04-11 RX ADMIN — ALBUTEROL 2 PUFF(S): 90 AEROSOL, METERED ORAL at 16:08

## 2020-04-11 RX ADMIN — Medication 200 MILLIGRAM(S): at 18:11

## 2020-04-11 NOTE — DIETITIAN INITIAL EVALUATION ADULT. - OTHER INFO
Pt 39 yo male presented with dyspnea, cough and fever with Hypoxia; Pt found COVID+ -> per chart review. Unable to conduct a face to face interview or nutrition-focused physical exam due to limited contact restrictions related to Pt's medical condition and isolation precautions. Collateral obtained from chart review. Unable to assess PO intake. Of note Rapid Response team called on Pt on 4/9; Pt remains in the unit. Per MD note Pt to be in prone position. No GI distress (nausea/vomiting/diarrhea/constipation) per chart. No chewing or swallowing difficulties at this time per chart. Unable to assess weight or diet history.

## 2020-04-11 NOTE — PROGRESS NOTE ADULT - SUBJECTIVE AND OBJECTIVE BOX
[facility] Division of Hospital Medicine  Leighton Ross MD  Pager (M-F, 8A-5P): [phone]  Other Times:  [phone]    Patient is a 40y old  Male who presents with a chief complaint of Dyspnea (10 Apr 2020 08:53)    SUBJECTIVE / OVERNIGHT EVENTS:  Patient still having some dyspnea and back pain.  No new issues overnight.  No N/V, CP,  lightheadedness, dizziness, abdominal pain, diarrhea, dysuria.    MEDICATIONS  (STANDING):  ALBUTerol    90 MICROgram(s) HFA Inhaler 2 Puff(s) Inhalation every 6 hours  anakinra Injectable 100 milliGRAM(s) SubCutaneous every 6 hours  enoxaparin Injectable 40 milliGRAM(s) SubCutaneous two times a day  hydroxychloroquine 200 milliGRAM(s) Oral every 12 hours  hydroxychloroquine   Oral   methylPREDNISolone sodium succinate Injectable 40 milliGRAM(s) IV Push two times a day    MEDICATIONS  (PRN):  acetaminophen   Tablet .. 650 milliGRAM(s) Oral every 4 hours PRN Temp greater or equal to 38.5C (101.3F)  benzonatate 100 milliGRAM(s) Oral three times a day PRN Cough      Vital Signs Last 24 Hrs  T(C): 36.3 (11 Apr 2020 13:30), Max: 36.7 (10 Apr 2020 23:16)  T(F): 97.4 (11 Apr 2020 13:30), Max: 98 (10 Apr 2020 23:16)  HR: 75 (11 Apr 2020 13:30) (75 - 92)  BP: 114/77 (11 Apr 2020 13:30) (110/74 - 114/77)  BP(mean): --  RR: 20 (11 Apr 2020 13:30) (20 - 22)  SpO2: 87% (11 Apr 2020 13:30) (87% - 89%)  CAPILLARY BLOOD GLUCOSE        I&O's Summary    10 Apr 2020 07:01  -  11 Apr 2020 07:00  --------------------------------------------------------  IN: 300 mL / OUT: 900 mL / NET: -600 mL        PHYSICAL EXAM:  GENERAL: In mild respiratory distress.  HEAD:  Atraumatic, Normocephalic  EYES: EOMI, PERRLA, conjunctiva and sclera clear  NECK: Supple, No JVD  CHEST/LUNG: No respiratory distress  ABDOMEN: Nondistended; Bowel sounds present  EXTREMITIES:  No clubbing, cyanosis, or edema  PSYCH: Calm  NEUROLOGY: A/Ox3, non-focal  SKIN: No rashes or lesions    LABS:                        17.3   15.00 )-----------( 525      ( 11 Apr 2020 05:25 )             51.9     04-11    132<L>  |  95<L>  |  27<H>  ----------------------------<  125<H>  4.8   |  25  |  0.74    Ca    8.9      11 Apr 2020 05:25  Phos  3.6     04-11  Mg     2.6     04-11    TPro  7.3  /  Alb  3.3  /  TBili  0.4  /  DBili  x   /  AST  29  /  ALT  79<H>  /  AlkPhos  106  04-11              Procalcitonin, Serum: 0.18 ng/mL (04-10-20 @ 06:30)    C-Reactive Protein, Serum: 117.9 mg/L (04-10-20 @ 06:30)      D-Dimer Assay, Quantitative: 459 ng/mL (04-10-20 @ 12:50)  D-Dimer Assay, Quantitative: 623 ng/mL (04-08-20 @ 23:50)    Ferritin, Serum: 2138 ng/mL (04-10-20 @ 12:50)  Ferritin, Serum: 2127 ng/mL (04-10-20 @ 06:30)  Ferritin, Serum: 2391 ng/mL (04-08-20 @ 23:30)      RADIOLOGY & ADDITIONAL TESTS:    Imaging Personally Reviewed:    Care Discussed with Consultants/Other Providers:

## 2020-04-11 NOTE — PROGRESS NOTE ADULT - PROBLEM SELECTOR PLAN 3
1.  Name of PCP: Dr. Bharathi Ramirez in [place]  2.  PCP Contacted on Admission: [ ] Y    [x ] N    3.  PCP contacted at Discharge: [ ] Y    [ ] N    [ ] N/A  4.  Post-Discharge Appointment Date and Location:  5.  Summary of Handoff given to PCP:

## 2020-04-11 NOTE — DIETITIAN INITIAL EVALUATION ADULT. - ADD RECOMMEND
1. Encourage & assist Pt with meals; Monitor PO diet tolerance; Honor food preferences;         2. Monitor labs, hydration status;

## 2020-04-11 NOTE — DIETITIAN INITIAL EVALUATION ADULT. - PERTINENT LABORATORY DATA
(4/11) WBC 15.00 H, H/H 17.3/51.9 H,  H, Na 132 L, Cl 95 L, BUN 27 H, Glu 125 H, Albumin 3.3,   ALP 79 H

## 2020-04-12 LAB
ALBUMIN SERPL ELPH-MCNC: 3.3 G/DL — SIGNIFICANT CHANGE UP (ref 3.3–5)
ALP SERPL-CCNC: 104 U/L — SIGNIFICANT CHANGE UP (ref 40–120)
ALT FLD-CCNC: 58 U/L — HIGH (ref 4–41)
ANION GAP SERPL CALC-SCNC: 20 MMO/L — HIGH (ref 7–14)
AST SERPL-CCNC: 23 U/L — SIGNIFICANT CHANGE UP (ref 4–40)
BASOPHILS # BLD AUTO: 0.18 K/UL — SIGNIFICANT CHANGE UP (ref 0–0.2)
BASOPHILS NFR BLD AUTO: 0.9 % — SIGNIFICANT CHANGE UP (ref 0–2)
BILIRUB SERPL-MCNC: 0.6 MG/DL — SIGNIFICANT CHANGE UP (ref 0.2–1.2)
BUN SERPL-MCNC: 27 MG/DL — HIGH (ref 7–23)
CALCIUM SERPL-MCNC: 9.2 MG/DL — SIGNIFICANT CHANGE UP (ref 8.4–10.5)
CHLORIDE SERPL-SCNC: 96 MMOL/L — LOW (ref 98–107)
CO2 SERPL-SCNC: 19 MMOL/L — LOW (ref 22–31)
CREAT SERPL-MCNC: 0.74 MG/DL — SIGNIFICANT CHANGE UP (ref 0.5–1.3)
CRP SERPL-MCNC: 22.8 MG/L — HIGH
D DIMER BLD IA.RAPID-MCNC: 356 NG/ML — SIGNIFICANT CHANGE UP
EOSINOPHIL # BLD AUTO: 0.18 K/UL — SIGNIFICANT CHANGE UP (ref 0–0.5)
EOSINOPHIL NFR BLD AUTO: 0.9 % — SIGNIFICANT CHANGE UP (ref 0–6)
FERRITIN SERPL-MCNC: 1170 NG/ML — HIGH (ref 30–400)
GLUCOSE SERPL-MCNC: 127 MG/DL — HIGH (ref 70–99)
HCT VFR BLD CALC: 58.1 % — CRITICAL HIGH (ref 39–50)
HGB BLD-MCNC: 18.2 G/DL — HIGH (ref 13–17)
IMM GRANULOCYTES NFR BLD AUTO: 4 % — HIGH (ref 0–1.5)
LDH SERPL L TO P-CCNC: 449 U/L — HIGH (ref 135–225)
LYMPHOCYTES # BLD AUTO: 1.37 K/UL — SIGNIFICANT CHANGE UP (ref 1–3.3)
LYMPHOCYTES # BLD AUTO: 6.6 % — LOW (ref 13–44)
MAGNESIUM SERPL-MCNC: 2.3 MG/DL — SIGNIFICANT CHANGE UP (ref 1.6–2.6)
MCHC RBC-ENTMCNC: 28.3 PG — SIGNIFICANT CHANGE UP (ref 27–34)
MCHC RBC-ENTMCNC: 31.3 % — LOW (ref 32–36)
MCV RBC AUTO: 90.4 FL — SIGNIFICANT CHANGE UP (ref 80–100)
MONOCYTES # BLD AUTO: 0.9 K/UL — SIGNIFICANT CHANGE UP (ref 0–0.9)
MONOCYTES NFR BLD AUTO: 4.3 % — SIGNIFICANT CHANGE UP (ref 2–14)
NEUTROPHILS # BLD AUTO: 17.27 K/UL — HIGH (ref 1.8–7.4)
NEUTROPHILS NFR BLD AUTO: 83.3 % — HIGH (ref 43–77)
NRBC # FLD: 0 K/UL — SIGNIFICANT CHANGE UP (ref 0–0)
PHOSPHATE SERPL-MCNC: 3.3 MG/DL — SIGNIFICANT CHANGE UP (ref 2.5–4.5)
PLATELET # BLD AUTO: 502 K/UL — HIGH (ref 150–400)
PMV BLD: 9.8 FL — SIGNIFICANT CHANGE UP (ref 7–13)
POTASSIUM SERPL-MCNC: 4.8 MMOL/L — SIGNIFICANT CHANGE UP (ref 3.5–5.3)
POTASSIUM SERPL-SCNC: 4.8 MMOL/L — SIGNIFICANT CHANGE UP (ref 3.5–5.3)
PROCALCITONIN SERPL-MCNC: 0.09 NG/ML — SIGNIFICANT CHANGE UP (ref 0.02–0.1)
PROT SERPL-MCNC: 7.6 G/DL — SIGNIFICANT CHANGE UP (ref 6–8.3)
RBC # BLD: 6.43 M/UL — HIGH (ref 4.2–5.8)
RBC # FLD: 13.7 % — SIGNIFICANT CHANGE UP (ref 10.3–14.5)
SODIUM SERPL-SCNC: 135 MMOL/L — SIGNIFICANT CHANGE UP (ref 135–145)
WBC # BLD: 20.73 K/UL — HIGH (ref 3.8–10.5)
WBC # FLD AUTO: 20.73 K/UL — HIGH (ref 3.8–10.5)

## 2020-04-12 PROCEDURE — 99233 SBSQ HOSP IP/OBS HIGH 50: CPT

## 2020-04-12 RX ADMIN — ENOXAPARIN SODIUM 40 MILLIGRAM(S): 100 INJECTION SUBCUTANEOUS at 17:44

## 2020-04-12 RX ADMIN — ALBUTEROL 2 PUFF(S): 90 AEROSOL, METERED ORAL at 15:42

## 2020-04-12 RX ADMIN — Medication 200 MILLIGRAM(S): at 17:46

## 2020-04-12 RX ADMIN — Medication 40 MILLIGRAM(S): at 05:02

## 2020-04-12 RX ADMIN — ALBUTEROL 2 PUFF(S): 90 AEROSOL, METERED ORAL at 09:16

## 2020-04-12 RX ADMIN — ALBUTEROL 2 PUFF(S): 90 AEROSOL, METERED ORAL at 22:23

## 2020-04-12 RX ADMIN — Medication 100 MILLIGRAM(S): at 00:23

## 2020-04-12 RX ADMIN — Medication 40 MILLIGRAM(S): at 17:44

## 2020-04-12 RX ADMIN — Medication 100 MILLIGRAM(S): at 05:02

## 2020-04-12 RX ADMIN — ENOXAPARIN SODIUM 40 MILLIGRAM(S): 100 INJECTION SUBCUTANEOUS at 05:02

## 2020-04-12 RX ADMIN — Medication 200 MILLIGRAM(S): at 05:01

## 2020-04-12 RX ADMIN — ALBUTEROL 2 PUFF(S): 90 AEROSOL, METERED ORAL at 05:02

## 2020-04-12 RX ADMIN — ALBUTEROL 2 PUFF(S): 90 AEROSOL, METERED ORAL at 00:26

## 2020-04-12 NOTE — PROVIDER CONTACT NOTE (OTHER) - ASSESSMENT
Pt noted to be sating at 78% on 100% nonrebreather and 10L nasal canula. No distress noted. Pt noted to have dry cough. Vigorous manual chest PT done. Repositioned to prone and side lying. Pts sats now increased to 88%, will continue to monitor.

## 2020-04-12 NOTE — RAPID RESPONSE TEAM SUMMARY - NSSITUATIONBACKGROUNDRRT_GEN_ALL_CORE
39 y/o M with no pmh p/w sepsis POA from COVID19 PNA with hypoxia. Currently on Plaquenil, steroids, Anakinra. RRT called for hypoxemia to 80%. Patient placed in prone position with minimal improvement to 86-88% afebrile, BP/HR wnl. Patient appeared euvolemic-hypovolemic. In no acute distress RR 22. AOx3. Unlikely PE.   -continue t oprone, adjust position to highest O2.   -could consider ABG to corrolate SpO2  -if patient continues to desaturate despite proning (80s-84%) would discuss with patient about intubation.
Mr. Carlin is a 40 year old man with no medical history who is admitted with hypoxic respiratory failure secondary to COVID-19. RRT called for ABG which showed PO2 58.

## 2020-04-12 NOTE — PROGRESS NOTE ADULT - SUBJECTIVE AND OBJECTIVE BOX
[facility] Division of Hospital Medicine  Leighton Ross MD  Pager (M-F, 8A-5P): [phone]  Other Times:  [phone]    Patient is a 40y old  Male who presents with a chief complaint of Dyspnea (11 Apr 2020 13:35)    SUBJECTIVE / OVERNIGHT EVENTS:  Patient had RRT this AM due to hypoxia.  Improved after but still only saturating 88-91% on NRB resting in bed.  No N/V, CP,  lightheadedness, dizziness, abdominal pain, diarrhea, dysuria.    MEDICATIONS  (STANDING):  ALBUTerol    90 MICROgram(s) HFA Inhaler 2 Puff(s) Inhalation every 6 hours  enoxaparin Injectable 40 milliGRAM(s) SubCutaneous two times a day  hydroxychloroquine 200 milliGRAM(s) Oral every 12 hours  hydroxychloroquine   Oral   methylPREDNISolone sodium succinate Injectable 40 milliGRAM(s) IV Push two times a day    MEDICATIONS  (PRN):  acetaminophen   Tablet .. 650 milliGRAM(s) Oral every 4 hours PRN Temp greater or equal to 38.5C (101.3F)  benzonatate 100 milliGRAM(s) Oral three times a day PRN Cough      Vital Signs Last 24 Hrs  T(C): 36.5 (11 Apr 2020 21:41), Max: 36.5 (11 Apr 2020 21:41)  T(F): 97.7 (11 Apr 2020 21:41), Max: 97.7 (11 Apr 2020 21:41)  HR: 94 (12 Apr 2020 05:02) (75 - 96)  BP: 128/77 (11 Apr 2020 21:41) (114/77 - 128/77)  BP(mean): --  RR: 25 (12 Apr 2020 06:06) (20 - 25)  SpO2: 86% (12 Apr 2020 06:06) (86% - 95%)  CAPILLARY BLOOD GLUCOSE        I&O's Summary    11 Apr 2020 07:01  -  12 Apr 2020 07:00  --------------------------------------------------------  IN: 0 mL / OUT: 300 mL / NET: -300 mL        PHYSICAL EXAM:  GENERAL: looks uncomfortable.  HEAD:  Atraumatic, Normocephalic  EYES: EOMI, PERRLA, injected conjunctiva b/l  NECK: Supple, No JVD  CHEST/LUNG: mild respiratory distress  ABDOMEN: Nondistended; Bowel sounds present  EXTREMITIES:  No clubbing, cyanosis, or edema  PSYCH: Calm  NEUROLOGY: A/Ox3, non-focal  SKIN: No rashes or lesions    LABS:                        18.2   20.73 )-----------( 502      ( 12 Apr 2020 06:49 )             58.1     04-12    135  |  96<L>  |  27<H>  ----------------------------<  127<H>  4.8   |  19<L>  |  0.74    Ca    9.2      12 Apr 2020 07:00  Phos  3.3     04-12  Mg     2.3     04-12    TPro  7.6  /  Alb  3.3  /  TBili  0.6  /  DBili  x   /  AST  23  /  ALT  58<H>  /  AlkPhos  104  04-12              Procalcitonin, Serum: 0.09 ng/mL (04-12-20 @ 07:00)  Procalcitonin, Serum: 0.18 ng/mL (04-10-20 @ 06:30)    C-Reactive Protein, Serum: 22.8 mg/L (04-12-20 @ 06:49)  C-Reactive Protein, Serum: 117.9 mg/L (04-10-20 @ 06:30)      D-Dimer Assay, Quantitative: 356 ng/mL (04-12-20 @ 06:49)  D-Dimer Assay, Quantitative: 459 ng/mL (04-10-20 @ 12:50)  D-Dimer Assay, Quantitative: 623 ng/mL (04-08-20 @ 23:50)    Ferritin, Serum: 1170 ng/mL (04-12-20 @ 07:00)  Ferritin, Serum: 2138 ng/mL (04-10-20 @ 12:50)  Ferritin, Serum: 2127 ng/mL (04-10-20 @ 06:30)  Ferritin, Serum: 2391 ng/mL (04-08-20 @ 23:30)      RADIOLOGY & ADDITIONAL TESTS:    Imaging Personally Reviewed:    Care Discussed with Consultants/Other Providers:

## 2020-04-12 NOTE — PROGRESS NOTE ADULT - ATTENDING COMMENTS
d/w niece Nellie Fajardo [phone] 4/9, 4/10, 4/11, 4/12 - answered all the questions.  They are taking care of pt's 5yo daughter.

## 2020-04-12 NOTE — CHART NOTE - NSCHARTNOTEFT_GEN_A_CORE
RRT called for hypoxia while on NRB. Patient was satting at 78% with a range of high 70's- early 80's%.   RRT team assessed and proned patient which helped bring O2 sat up to high 80's. Patient is a candidate for intubation based on age and lack of medical history. Will continue to monitor and position patient.    Jonelle Myles PA-C  pager [phone]

## 2020-04-12 NOTE — PROGRESS NOTE ADULT - PROBLEM SELECTOR PLAN 3
1.  Name of PCP: Dr. Bharathi Ramirez in [place]  2.  PCP Contacted on Admission: [ ] Y    [x ] N    3.  PCP contacted at Discharge: [ ] Y    [ ] N    [ ] N/A  4.  Post-Discharge Appointment Date and Location:  5.  Summary of Handoff given to PCP:

## 2020-04-13 LAB
ALBUMIN SERPL ELPH-MCNC: 3.1 G/DL — LOW (ref 3.3–5)
ALP SERPL-CCNC: 96 U/L — SIGNIFICANT CHANGE UP (ref 40–120)
ALT FLD-CCNC: 40 U/L — SIGNIFICANT CHANGE UP (ref 4–41)
ANION GAP SERPL CALC-SCNC: 16 MMO/L — HIGH (ref 7–14)
AST SERPL-CCNC: 16 U/L — SIGNIFICANT CHANGE UP (ref 4–40)
BASOPHILS # BLD AUTO: 0.09 K/UL — SIGNIFICANT CHANGE UP (ref 0–0.2)
BASOPHILS NFR BLD AUTO: 0.5 % — SIGNIFICANT CHANGE UP (ref 0–2)
BILIRUB SERPL-MCNC: 0.5 MG/DL — SIGNIFICANT CHANGE UP (ref 0.2–1.2)
BUN SERPL-MCNC: 30 MG/DL — HIGH (ref 7–23)
CALCIUM SERPL-MCNC: 9.2 MG/DL — SIGNIFICANT CHANGE UP (ref 8.4–10.5)
CHLORIDE SERPL-SCNC: 96 MMOL/L — LOW (ref 98–107)
CO2 SERPL-SCNC: 22 MMOL/L — SIGNIFICANT CHANGE UP (ref 22–31)
CREAT SERPL-MCNC: 0.78 MG/DL — SIGNIFICANT CHANGE UP (ref 0.5–1.3)
EOSINOPHIL # BLD AUTO: 0.13 K/UL — SIGNIFICANT CHANGE UP (ref 0–0.5)
EOSINOPHIL NFR BLD AUTO: 0.7 % — SIGNIFICANT CHANGE UP (ref 0–6)
FERRITIN SERPL-MCNC: 1288 NG/ML — HIGH (ref 30–400)
GLUCOSE SERPL-MCNC: 121 MG/DL — HIGH (ref 70–99)
HCT VFR BLD CALC: 55.2 % — HIGH (ref 39–50)
HGB BLD-MCNC: 18.2 G/DL — HIGH (ref 13–17)
IMM GRANULOCYTES NFR BLD AUTO: 3.9 % — HIGH (ref 0–1.5)
LYMPHOCYTES # BLD AUTO: 1.98 K/UL — SIGNIFICANT CHANGE UP (ref 1–3.3)
LYMPHOCYTES # BLD AUTO: 11 % — LOW (ref 13–44)
MAGNESIUM SERPL-MCNC: 2.2 MG/DL — SIGNIFICANT CHANGE UP (ref 1.6–2.6)
MCHC RBC-ENTMCNC: 28.5 PG — SIGNIFICANT CHANGE UP (ref 27–34)
MCHC RBC-ENTMCNC: 33 % — SIGNIFICANT CHANGE UP (ref 32–36)
MCV RBC AUTO: 86.5 FL — SIGNIFICANT CHANGE UP (ref 80–100)
MONOCYTES # BLD AUTO: 1.13 K/UL — HIGH (ref 0–0.9)
MONOCYTES NFR BLD AUTO: 6.3 % — SIGNIFICANT CHANGE UP (ref 2–14)
NEUTROPHILS # BLD AUTO: 13.91 K/UL — HIGH (ref 1.8–7.4)
NEUTROPHILS NFR BLD AUTO: 77.6 % — HIGH (ref 43–77)
NRBC # FLD: 0 K/UL — SIGNIFICANT CHANGE UP (ref 0–0)
PHOSPHATE SERPL-MCNC: 3.5 MG/DL — SIGNIFICANT CHANGE UP (ref 2.5–4.5)
PLATELET # BLD AUTO: 521 K/UL — HIGH (ref 150–400)
PMV BLD: 9.5 FL — SIGNIFICANT CHANGE UP (ref 7–13)
POTASSIUM SERPL-MCNC: 4.7 MMOL/L — SIGNIFICANT CHANGE UP (ref 3.5–5.3)
POTASSIUM SERPL-SCNC: 4.7 MMOL/L — SIGNIFICANT CHANGE UP (ref 3.5–5.3)
PROT SERPL-MCNC: 7.4 G/DL — SIGNIFICANT CHANGE UP (ref 6–8.3)
RBC # BLD: 6.38 M/UL — HIGH (ref 4.2–5.8)
RBC # FLD: 13.4 % — SIGNIFICANT CHANGE UP (ref 10.3–14.5)
SODIUM SERPL-SCNC: 134 MMOL/L — LOW (ref 135–145)
WBC # BLD: 17.94 K/UL — HIGH (ref 3.8–10.5)
WBC # FLD AUTO: 17.94 K/UL — HIGH (ref 3.8–10.5)

## 2020-04-13 PROCEDURE — 99233 SBSQ HOSP IP/OBS HIGH 50: CPT

## 2020-04-13 RX ORDER — ZINC SULFATE TAB 220 MG (50 MG ZINC EQUIVALENT) 220 (50 ZN) MG
220 TAB ORAL ONCE
Refills: 0 | Status: COMPLETED | OUTPATIENT
Start: 2020-04-13 | End: 2020-04-13

## 2020-04-13 RX ORDER — ASCORBIC ACID 60 MG
500 TABLET,CHEWABLE ORAL
Refills: 0 | Status: DISCONTINUED | OUTPATIENT
Start: 2020-04-14 | End: 2020-04-29

## 2020-04-13 RX ORDER — ANAKINRA 100MG/0.67
100 SYRINGE (ML) SUBCUTANEOUS
Refills: 0 | Status: COMPLETED | OUTPATIENT
Start: 2020-04-13 | End: 2020-04-15

## 2020-04-13 RX ORDER — ZINC SULFATE TAB 220 MG (50 MG ZINC EQUIVALENT) 220 (50 ZN) MG
220 TAB ORAL DAILY
Refills: 0 | Status: DISCONTINUED | OUTPATIENT
Start: 2020-04-14 | End: 2020-04-29

## 2020-04-13 RX ORDER — ASCORBIC ACID 60 MG
500 TABLET,CHEWABLE ORAL ONCE
Refills: 0 | Status: COMPLETED | OUTPATIENT
Start: 2020-04-13 | End: 2020-04-13

## 2020-04-13 RX ADMIN — ALBUTEROL 2 PUFF(S): 90 AEROSOL, METERED ORAL at 16:29

## 2020-04-13 RX ADMIN — Medication 100 MILLIGRAM(S): at 11:07

## 2020-04-13 RX ADMIN — Medication 40 MILLIGRAM(S): at 05:30

## 2020-04-13 RX ADMIN — ENOXAPARIN SODIUM 40 MILLIGRAM(S): 100 INJECTION SUBCUTANEOUS at 17:10

## 2020-04-13 RX ADMIN — Medication 200 MILLIGRAM(S): at 17:10

## 2020-04-13 RX ADMIN — ALBUTEROL 2 PUFF(S): 90 AEROSOL, METERED ORAL at 11:24

## 2020-04-13 RX ADMIN — ALBUTEROL 2 PUFF(S): 90 AEROSOL, METERED ORAL at 04:14

## 2020-04-13 RX ADMIN — Medication 200 MILLIGRAM(S): at 05:30

## 2020-04-13 RX ADMIN — ZINC SULFATE TAB 220 MG (50 MG ZINC EQUIVALENT) 220 MILLIGRAM(S): 220 (50 ZN) TAB at 22:32

## 2020-04-13 RX ADMIN — ENOXAPARIN SODIUM 40 MILLIGRAM(S): 100 INJECTION SUBCUTANEOUS at 05:29

## 2020-04-13 RX ADMIN — Medication 500 MILLIGRAM(S): at 22:32

## 2020-04-13 RX ADMIN — Medication 100 MILLIGRAM(S): at 22:32

## 2020-04-13 NOTE — PROGRESS NOTE ADULT - PROBLEM SELECTOR PLAN 3
1.  Name of PCP: Dr. Bharathi Ramirez in [place]  2.  PCP Contacted on Admission: [ ] Y    [x ] N    3.  PCP contacted at Discharge: [ ] Y    [ ] N    [ ] N/A  4.  Post-Discharge Appointment Date and Location:  5.  Summary of Handoff given to PCP:

## 2020-04-13 NOTE — PROGRESS NOTE ADULT - SUBJECTIVE AND OBJECTIVE BOX
[facility] Division of Hospital Medicine  Cheryl Montes MD  Pager (M-F, 8A-5P):  In-house pager [phone]; Long-range pager [phone]  Other Times:  Please page Hospitalist in Charge -  In-house pager [phone]    Patient is a 40y old  Male who presents with a chief complaint of Dyspnea (12 Apr 2020 13:02)      SUBJECTIVE / OVERNIGHT EVENTS: ...  ADDITIONAL REVIEW OF SYSTEMS:    MEDICATIONS  (STANDING):  ALBUTerol    90 MICROgram(s) HFA Inhaler 2 Puff(s) Inhalation every 6 hours  anakinra Injectable 100 milliGRAM(s) SubCutaneous two times a day  enoxaparin Injectable 40 milliGRAM(s) SubCutaneous two times a day  hydroxychloroquine 200 milliGRAM(s) Oral every 12 hours  hydroxychloroquine   Oral     MEDICATIONS  (PRN):  acetaminophen   Tablet .. 650 milliGRAM(s) Oral every 4 hours PRN Temp greater or equal to 38.5C (101.3F)  benzonatate 100 milliGRAM(s) Oral three times a day PRN Cough      CAPILLARY BLOOD GLUCOSE        I&O's Summary      PHYSICAL EXAM:  Vital Signs Last 24 Hrs  T(C): 36.9 (12 Apr 2020 20:25), Max: 36.9 (12 Apr 2020 20:25)  T(F): 98.5 (12 Apr 2020 20:25), Max: 98.5 (12 Apr 2020 20:25)  HR: 103 (12 Apr 2020 20:25) (77 - 103)  BP: 103/67 (12 Apr 2020 20:25) (103/67 - 111/72)  BP(mean): --  RR: 24 (12 Apr 2020 20:25) (24 - 24)  SpO2: 95% (12 Apr 2020 20:25) (90% - 95%)    GENERAL: looks uncomfortable.  HEAD:  Atraumatic, Normocephalic  EYES: EOMI, PERRLA, injected conjunctiva b/l  NECK: Supple, No JVD  CHEST/LUNG: mild respiratory distress  ABDOMEN: Nondistended; Bowel sounds present  EXTREMITIES:  No clubbing, cyanosis, or edema  PSYCH: Calm  NEUROLOGY: A/Ox3, non-focal  SKIN: No rashes or lesions    LABS:                        18.2   17.94 )-----------( 521      ( 13 Apr 2020 06:50 )             55.2     04-13    134<L>  |  96<L>  |  30<H>  ----------------------------<  121<H>  4.7   |  22  |  0.78    Ca    9.2      13 Apr 2020 06:50  Phos  3.5     04-13  Mg     2.2     04-13    TPro  7.4  /  Alb  3.1<L>  /  TBili  0.5  /  DBili  x   /  AST  16  /  ALT  40  /  AlkPhos  96  04-13                RADIOLOGY & ADDITIONAL TESTS:  Results Reviewed:   Imaging Personally Reviewed:  Electrocardiogram Personally Reviewed:    COORDINATION OF CARE:  Care Discussed with Consultants/Other Providers [Y/N]: RN, SW, CM, ACP re overall care   Prior or Outpatient Records Reviewed [Y/N]: [facility] Division of Hospital Medicine  Cheryl Montes MD  Pager (M-F, 8A-5P):  In-house pager [phone]; Long-range pager [phone]  Other Times:  Please page Hospitalist in Charge -  In-house pager [phone]    Patient is a 40y old  Male who presents with a chief complaint of Dyspnea (12 Apr 2020 13:02)      SUBJECTIVE / OVERNIGHT EVENTS: Pt seen earlier today.  Doing better, down to 50%  ADDITIONAL REVIEW OF SYSTEMS:    MEDICATIONS  (STANDING):  ALBUTerol    90 MICROgram(s) HFA Inhaler 2 Puff(s) Inhalation every 6 hours  anakinra Injectable 100 milliGRAM(s) SubCutaneous two times a day  enoxaparin Injectable 40 milliGRAM(s) SubCutaneous two times a day  hydroxychloroquine 200 milliGRAM(s) Oral every 12 hours  hydroxychloroquine   Oral     MEDICATIONS  (PRN):  acetaminophen   Tablet .. 650 milliGRAM(s) Oral every 4 hours PRN Temp greater or equal to 38.5C (101.3F)  benzonatate 100 milliGRAM(s) Oral three times a day PRN Cough      CAPILLARY BLOOD GLUCOSE        I&O's Summary      PHYSICAL EXAM:  Vital Signs Last 24 Hrs  T(C): 36.9 (12 Apr 2020 20:25), Max: 36.9 (12 Apr 2020 20:25)  T(F): 98.5 (12 Apr 2020 20:25), Max: 98.5 (12 Apr 2020 20:25)  HR: 103 (12 Apr 2020 20:25) (77 - 103)  BP: 103/67 (12 Apr 2020 20:25) (103/67 - 111/72)  BP(mean): --  RR: 24 (12 Apr 2020 20:25) (24 - 24)  SpO2: 95% (12 Apr 2020 20:25) (90% - 95%)    GENERAL: sitting up in bed, more comfortable  HEAD:  Atraumatic, Normocephalic  EYES: EOMI, PERRLA, injected conjunctiva b/l  NECK: Supple, No JVD  CHEST/LUNG: clear  ABDOMEN: Nondistended; Bowel sounds present  EXTREMITIES:  No clubbing, cyanosis, or edema  PSYCH: Calm  NEUROLOGY: A/Ox3, non-focal  SKIN: No rashes or lesions    LABS:                        18.2   17.94 )-----------( 521      ( 13 Apr 2020 06:50 )             55.2     04-13    134<L>  |  96<L>  |  30<H>  ----------------------------<  121<H>  4.7   |  22  |  0.78    Ca    9.2      13 Apr 2020 06:50  Phos  3.5     04-13  Mg     2.2     04-13    TPro  7.4  /  Alb  3.1<L>  /  TBili  0.5  /  DBili  x   /  AST  16  /  ALT  40  /  AlkPhos  96  04-13                RADIOLOGY & ADDITIONAL TESTS:  Results Reviewed:   Imaging Personally Reviewed:  Electrocardiogram Personally Reviewed:    COORDINATION OF CARE:  Care Discussed with Consultants/Other Providers [Y/N]: RN, SW, CM, ACP re overall care   Prior or Outpatient Records Reviewed [Y/N]:

## 2020-04-13 NOTE — PROGRESS NOTE ADULT - ATTENDING COMMENTS
d/w niece Nellie Fajardo [phone] 4/9, 4/10, 4/11, 4/12 - answered all the questions.  They are taking care of pt's 3yo daughter. d/w niece Nellie Fajardo [phone] 4/9, 4/10, 4/11, 4/12, 4/13 - answered all the questions.  They are taking care of pt's 3yo daughter.

## 2020-04-14 LAB
ANION GAP SERPL CALC-SCNC: 21 MMO/L — HIGH (ref 7–14)
BUN SERPL-MCNC: 30 MG/DL — HIGH (ref 7–23)
CALCIUM SERPL-MCNC: 9 MG/DL — SIGNIFICANT CHANGE UP (ref 8.4–10.5)
CHLORIDE SERPL-SCNC: 93 MMOL/L — LOW (ref 98–107)
CO2 SERPL-SCNC: 18 MMOL/L — LOW (ref 22–31)
CREAT SERPL-MCNC: 0.79 MG/DL — SIGNIFICANT CHANGE UP (ref 0.5–1.3)
CRP SERPL-MCNC: 30.7 MG/L — HIGH
D DIMER BLD IA.RAPID-MCNC: 277 NG/ML — SIGNIFICANT CHANGE UP
FERRITIN SERPL-MCNC: 1289 NG/ML — HIGH (ref 30–400)
GLUCOSE SERPL-MCNC: 93 MG/DL — SIGNIFICANT CHANGE UP (ref 70–99)
LDH SERPL L TO P-CCNC: 578 U/L — HIGH (ref 135–225)
MAGNESIUM SERPL-MCNC: 2.2 MG/DL — SIGNIFICANT CHANGE UP (ref 1.6–2.6)
PHOSPHATE SERPL-MCNC: 3.5 MG/DL — SIGNIFICANT CHANGE UP (ref 2.5–4.5)
POTASSIUM SERPL-MCNC: 5.1 MMOL/L — SIGNIFICANT CHANGE UP (ref 3.5–5.3)
POTASSIUM SERPL-SCNC: 5.1 MMOL/L — SIGNIFICANT CHANGE UP (ref 3.5–5.3)
SODIUM SERPL-SCNC: 132 MMOL/L — LOW (ref 135–145)

## 2020-04-14 PROCEDURE — 99233 SBSQ HOSP IP/OBS HIGH 50: CPT

## 2020-04-14 RX ADMIN — ENOXAPARIN SODIUM 40 MILLIGRAM(S): 100 INJECTION SUBCUTANEOUS at 17:38

## 2020-04-14 RX ADMIN — ALBUTEROL 2 PUFF(S): 90 AEROSOL, METERED ORAL at 03:35

## 2020-04-14 RX ADMIN — ALBUTEROL 2 PUFF(S): 90 AEROSOL, METERED ORAL at 16:00

## 2020-04-14 RX ADMIN — ZINC SULFATE TAB 220 MG (50 MG ZINC EQUIVALENT) 220 MILLIGRAM(S): 220 (50 ZN) TAB at 11:36

## 2020-04-14 RX ADMIN — ENOXAPARIN SODIUM 40 MILLIGRAM(S): 100 INJECTION SUBCUTANEOUS at 06:00

## 2020-04-14 RX ADMIN — Medication 500 MILLIGRAM(S): at 17:38

## 2020-04-14 RX ADMIN — ALBUTEROL 2 PUFF(S): 90 AEROSOL, METERED ORAL at 10:02

## 2020-04-14 RX ADMIN — Medication 500 MILLIGRAM(S): at 06:00

## 2020-04-14 RX ADMIN — Medication 100 MILLIGRAM(S): at 21:16

## 2020-04-14 RX ADMIN — ALBUTEROL 2 PUFF(S): 90 AEROSOL, METERED ORAL at 20:02

## 2020-04-14 RX ADMIN — Medication 100 MILLIGRAM(S): at 11:36

## 2020-04-14 NOTE — PROGRESS NOTE ADULT - PROBLEM SELECTOR PLAN 3
1.  Name of PCP: Dr. Bharathi Ramirez in [place]  2.  PCP Contacted on Admission: [ ] Y    [x ] N    3.  PCP contacted at Discharge: [ ] Y    [ ] N    [ ] N/A  4.  Post-Discharge Appointment Date and Location:  5.  Summary of Handoff given to PCP:

## 2020-04-14 NOTE — PROGRESS NOTE ADULT - SUBJECTIVE AND OBJECTIVE BOX
[facility] Division of Hospital Medicine  Cheryl Montes MD  Pager (M-F, 8A-5P):  In-house pager [phone]; Long-range pager [phone]  Other Times:  Please page Hospitalist in Charge -  In-house pager [phone]    Patient is a 40y old  Male who presents with a chief complaint of Dyspnea (13 Apr 2020 10:33)      SUBJECTIVE / OVERNIGHT EVENTS:  Pt seen earlier today.  Says breathing better, more comfortable.  No pain.  Eating a little.    ADDITIONAL REVIEW OF SYSTEMS:    MEDICATIONS  (STANDING):  ALBUTerol    90 MICROgram(s) HFA Inhaler 2 Puff(s) Inhalation every 6 hours  anakinra Injectable 100 milliGRAM(s) SubCutaneous two times a day  ascorbic acid 500 milliGRAM(s) Oral two times a day  enoxaparin Injectable 40 milliGRAM(s) SubCutaneous two times a day  zinc sulfate 220 milliGRAM(s) Oral daily    MEDICATIONS  (PRN):  acetaminophen   Tablet .. 650 milliGRAM(s) Oral every 4 hours PRN Temp greater or equal to 38.5C (101.3F)  benzonatate 100 milliGRAM(s) Oral three times a day PRN Cough      CAPILLARY BLOOD GLUCOSE        I&O's Summary      PHYSICAL EXAM:  Vital Signs Last 24 Hrs  T(C): 37 (14 Apr 2020 13:15), Max: 37.7 (14 Apr 2020 07:15)  T(F): 98.6 (14 Apr 2020 13:15), Max: 99.8 (14 Apr 2020 07:15)  HR: 105 (14 Apr 2020 16:01) (99 - 115)  BP: 118/76 (14 Apr 2020 13:15) (117/83 - 118/76)  BP(mean): --  RR: 24 (14 Apr 2020 13:15) (24 - 30)  SpO2: 96% (14 Apr 2020 13:15) (86% - 96%)    GENERAL: sitting up in bed, more comfortable  HEAD:  Atraumatic, Normocephalic  EYES: EOMI, PERRLA, injected conjunctiva b/l  NECK: Supple, No JVD  CHEST/LUNG: clear  ABDOMEN: Nondistended; Bowel sounds present  EXTREMITIES:  No clubbing, cyanosis, or edema  PSYCH: Calm  NEUROLOGY: A/Ox3, non-focal  SKIN: No rashes or lesions    LABS:                        18.2   17.94 )-----------( 521      ( 13 Apr 2020 06:50 )             55.2     04-14    132<L>  |  93<L>  |  30<H>  ----------------------------<  93  5.1   |  18<L>  |  0.79    Ca    9.0      14 Apr 2020 07:00  Phos  3.5     04-14  Mg     2.2     04-14    TPro  7.4  /  Alb  3.1<L>  /  TBili  0.5  /  DBili  x   /  AST  16  /  ALT  40  /  AlkPhos  96  04-13                RADIOLOGY & ADDITIONAL TESTS:  Results Reviewed:   Imaging Personally Reviewed:  Electrocardiogram Personally Reviewed:    COORDINATION OF CARE:  Care Discussed with Consultants/Other Providers [Y/N]: RN, SW, CM, ACP re overall care   Prior or Outpatient Records Reviewed [Y/N]:

## 2020-04-14 NOTE — PROGRESS NOTE ADULT - ASSESSMENT
39 y/o M with no pmh p/w sepsis POA from COVID19 PNA with hypoxia.  Acute hypoxic respiratory failure

## 2020-04-14 NOTE — PROGRESS NOTE ADULT - ATTENDING COMMENTS
d/w niece Nellie Fajardo [phone] 4/9, 4/10, 4/11, 4/12, 4/13, 4/14 - answered all the questions.  They are taking care of pt's 3yo daughter.

## 2020-04-14 NOTE — PROGRESS NOTE ADULT - PROBLEM SELECTOR PLAN 1
2/2 COVID 19 viral infection/pneumonia  - Plaquenil, steroids, Anakinra, proning, chest PT/IS/albuterol, titrate O2 as able  - full code

## 2020-04-15 LAB
ANION GAP SERPL CALC-SCNC: 14 MMO/L — SIGNIFICANT CHANGE UP (ref 7–14)
BUN SERPL-MCNC: 24 MG/DL — HIGH (ref 7–23)
CALCIUM SERPL-MCNC: 9.3 MG/DL — SIGNIFICANT CHANGE UP (ref 8.4–10.5)
CHLORIDE SERPL-SCNC: 95 MMOL/L — LOW (ref 98–107)
CO2 SERPL-SCNC: 24 MMOL/L — SIGNIFICANT CHANGE UP (ref 22–31)
CREAT SERPL-MCNC: 0.71 MG/DL — SIGNIFICANT CHANGE UP (ref 0.5–1.3)
GLUCOSE SERPL-MCNC: 107 MG/DL — HIGH (ref 70–99)
HCT VFR BLD CALC: 55.6 % — HIGH (ref 39–50)
HGB BLD-MCNC: 18.2 G/DL — HIGH (ref 13–17)
MAGNESIUM SERPL-MCNC: 2.2 MG/DL — SIGNIFICANT CHANGE UP (ref 1.6–2.6)
MCHC RBC-ENTMCNC: 28 PG — SIGNIFICANT CHANGE UP (ref 27–34)
MCHC RBC-ENTMCNC: 32.7 % — SIGNIFICANT CHANGE UP (ref 32–36)
MCV RBC AUTO: 85.5 FL — SIGNIFICANT CHANGE UP (ref 80–100)
NRBC # FLD: 0 K/UL — SIGNIFICANT CHANGE UP (ref 0–0)
PHOSPHATE SERPL-MCNC: 3.2 MG/DL — SIGNIFICANT CHANGE UP (ref 2.5–4.5)
PLATELET # BLD AUTO: 492 K/UL — HIGH (ref 150–400)
PMV BLD: 9.4 FL — SIGNIFICANT CHANGE UP (ref 7–13)
POTASSIUM SERPL-MCNC: 4.6 MMOL/L — SIGNIFICANT CHANGE UP (ref 3.5–5.3)
POTASSIUM SERPL-SCNC: 4.6 MMOL/L — SIGNIFICANT CHANGE UP (ref 3.5–5.3)
RBC # BLD: 6.5 M/UL — HIGH (ref 4.2–5.8)
RBC # FLD: 13 % — SIGNIFICANT CHANGE UP (ref 10.3–14.5)
SODIUM SERPL-SCNC: 133 MMOL/L — LOW (ref 135–145)
WBC # BLD: 17.13 K/UL — HIGH (ref 3.8–10.5)
WBC # FLD AUTO: 17.13 K/UL — HIGH (ref 3.8–10.5)

## 2020-04-15 PROCEDURE — 99233 SBSQ HOSP IP/OBS HIGH 50: CPT

## 2020-04-15 RX ADMIN — Medication 100 MILLIGRAM(S): at 11:38

## 2020-04-15 RX ADMIN — Medication 100 MILLIGRAM(S): at 11:39

## 2020-04-15 RX ADMIN — Medication 500 MILLIGRAM(S): at 05:25

## 2020-04-15 RX ADMIN — ENOXAPARIN SODIUM 40 MILLIGRAM(S): 100 INJECTION SUBCUTANEOUS at 05:25

## 2020-04-15 RX ADMIN — ALBUTEROL 2 PUFF(S): 90 AEROSOL, METERED ORAL at 03:51

## 2020-04-15 RX ADMIN — Medication 100 MILLIGRAM(S): at 23:14

## 2020-04-15 RX ADMIN — Medication 500 MILLIGRAM(S): at 17:53

## 2020-04-15 RX ADMIN — ALBUTEROL 2 PUFF(S): 90 AEROSOL, METERED ORAL at 20:57

## 2020-04-15 RX ADMIN — ZINC SULFATE TAB 220 MG (50 MG ZINC EQUIVALENT) 220 MILLIGRAM(S): 220 (50 ZN) TAB at 11:39

## 2020-04-15 RX ADMIN — ALBUTEROL 2 PUFF(S): 90 AEROSOL, METERED ORAL at 15:54

## 2020-04-15 RX ADMIN — ALBUTEROL 2 PUFF(S): 90 AEROSOL, METERED ORAL at 10:14

## 2020-04-15 RX ADMIN — ENOXAPARIN SODIUM 40 MILLIGRAM(S): 100 INJECTION SUBCUTANEOUS at 17:53

## 2020-04-15 NOTE — PROGRESS NOTE ADULT - SUBJECTIVE AND OBJECTIVE BOX
[facility] Division of Hospital Medicine  Leighton Ross MD  Pager (M-F, 8A-5P): [phone]  Other Times:  [phone]    Patient is a 40y old  Male who presents with a chief complaint of Dyspnea (14 Apr 2020 18:54)    SUBJECTIVE / OVERNIGHT EVENTS:  Patient states that he feels slightly improved.  No new complaints.  Niece by bedside.  No N/V, CP,  lightheadedness, dizziness, abdominal pain, diarrhea, dysuria.    MEDICATIONS  (STANDING):  ALBUTerol    90 MICROgram(s) HFA Inhaler 2 Puff(s) Inhalation every 6 hours  anakinra Injectable 100 milliGRAM(s) SubCutaneous two times a day  ascorbic acid 500 milliGRAM(s) Oral two times a day  enoxaparin Injectable 40 milliGRAM(s) SubCutaneous two times a day  zinc sulfate 220 milliGRAM(s) Oral daily    MEDICATIONS  (PRN):  acetaminophen   Tablet .. 650 milliGRAM(s) Oral every 4 hours PRN Temp greater or equal to 38.5C (101.3F)  benzonatate 100 milliGRAM(s) Oral three times a day PRN Cough      Vital Signs Last 24 Hrs  T(C): 37.2 (14 Apr 2020 21:14), Max: 37.2 (14 Apr 2020 21:14)  T(F): 98.9 (14 Apr 2020 21:14), Max: 98.9 (14 Apr 2020 21:14)  HR: 109 (15 Apr 2020 10:14) (99 - 117)  BP: 119/62 (14 Apr 2020 21:14) (119/62 - 119/62)  BP(mean): --  RR: 24 (14 Apr 2020 21:14) (24 - 24)  SpO2: 88% (15 Apr 2020 03:55) (88% - 94%)  CAPILLARY BLOOD GLUCOSE        I&O's Summary      PHYSICAL EXAM:  GENERAL: NAD  HEAD:  Atraumatic, Normocephalic  EYES: EOMI, PERRLA, conjunctiva and sclera clear  NECK: Supple, No JVD  CHEST/LUNG: Mild respiratory distress  ABDOMEN: Nondistended; Bowel sounds present  EXTREMITIES:  No clubbing, cyanosis, or edema  PSYCH: Calm  NEUROLOGY: A/Ox3, non-focal  SKIN: No rashes or lesions    LABS:                        18.2   17.13 )-----------( 492      ( 15 Apr 2020 06:00 )             55.6     04-15    133<L>  |  95<L>  |  24<H>  ----------------------------<  107<H>  4.6   |  24  |  0.71    Ca    9.3      15 Apr 2020 06:00  Phos  3.2     04-15  Mg     2.2     04-15                Procalcitonin, Serum: 0.09 ng/mL (04-12-20 @ 07:00)  Procalcitonin, Serum: 0.18 ng/mL (04-10-20 @ 06:30)    C-Reactive Protein, Serum: 30.7 mg/L (04-14-20 @ 07:00)  C-Reactive Protein, Serum: 22.8 mg/L (04-12-20 @ 06:49)  C-Reactive Protein, Serum: 117.9 mg/L (04-10-20 @ 06:30)      D-Dimer Assay, Quantitative: 277 ng/mL (04-14-20 @ 07:00)  D-Dimer Assay, Quantitative: 356 ng/mL (04-12-20 @ 06:49)  D-Dimer Assay, Quantitative: 459 ng/mL (04-10-20 @ 12:50)  D-Dimer Assay, Quantitative: 623 ng/mL (04-08-20 @ 23:50)    Ferritin, Serum: 1289 ng/mL (04-14-20 @ 07:00)  Ferritin, Serum: 1288 ng/mL (04-13-20 @ 06:50)  Ferritin, Serum: 1170 ng/mL (04-12-20 @ 07:00)  Ferritin, Serum: 2138 ng/mL (04-10-20 @ 12:50)  Ferritin, Serum: 2127 ng/mL (04-10-20 @ 06:30)  Ferritin, Serum: 2391 ng/mL (04-08-20 @ 23:30)      RADIOLOGY & ADDITIONAL TESTS:    Imaging Personally Reviewed:    Care Discussed with Consultants/Other Providers:

## 2020-04-15 NOTE — PROGRESS NOTE ADULT - ATTENDING COMMENTS
d/w niece Nellie Fajardo [phone] 4/9, 4/10, 4/11, 4/12, 4/13, 4/14 - answered all the questions.  Spoke with niece by bedside on 4/15. They are taking care of pt's 5yo daughter.

## 2020-04-16 LAB
ALBUMIN SERPL ELPH-MCNC: 3.1 G/DL — LOW (ref 3.3–5)
ALP SERPL-CCNC: 88 U/L — SIGNIFICANT CHANGE UP (ref 40–120)
ALT FLD-CCNC: 27 U/L — SIGNIFICANT CHANGE UP (ref 4–41)
ANION GAP SERPL CALC-SCNC: 13 MMO/L — SIGNIFICANT CHANGE UP (ref 7–14)
AST SERPL-CCNC: 24 U/L — SIGNIFICANT CHANGE UP (ref 4–40)
BILIRUB SERPL-MCNC: 0.8 MG/DL — SIGNIFICANT CHANGE UP (ref 0.2–1.2)
BUN SERPL-MCNC: 23 MG/DL — SIGNIFICANT CHANGE UP (ref 7–23)
CALCIUM SERPL-MCNC: 8.8 MG/DL — SIGNIFICANT CHANGE UP (ref 8.4–10.5)
CHLORIDE SERPL-SCNC: 96 MMOL/L — LOW (ref 98–107)
CO2 SERPL-SCNC: 24 MMOL/L — SIGNIFICANT CHANGE UP (ref 22–31)
CREAT SERPL-MCNC: 0.71 MG/DL — SIGNIFICANT CHANGE UP (ref 0.5–1.3)
CRP SERPL HS-MCNC: 77.42 MG/L — SIGNIFICANT CHANGE UP
D DIMER BLD IA.RAPID-MCNC: 253 NG/ML — SIGNIFICANT CHANGE UP
FERRITIN SERPL-MCNC: 1356 NG/ML — HIGH (ref 30–400)
GLUCOSE SERPL-MCNC: 139 MG/DL — HIGH (ref 70–99)
HCT VFR BLD CALC: 55.1 % — HIGH (ref 39–50)
HGB BLD-MCNC: 18.6 G/DL — HIGH (ref 13–17)
LDH SERPL L TO P-CCNC: 428 U/L — HIGH (ref 135–225)
MAGNESIUM SERPL-MCNC: 2.2 MG/DL — SIGNIFICANT CHANGE UP (ref 1.6–2.6)
MCHC RBC-ENTMCNC: 29.1 PG — SIGNIFICANT CHANGE UP (ref 27–34)
MCHC RBC-ENTMCNC: 33.8 % — SIGNIFICANT CHANGE UP (ref 32–36)
MCV RBC AUTO: 86.2 FL — SIGNIFICANT CHANGE UP (ref 80–100)
NRBC # FLD: 0 K/UL — SIGNIFICANT CHANGE UP (ref 0–0)
PHOSPHATE SERPL-MCNC: 3.3 MG/DL — SIGNIFICANT CHANGE UP (ref 2.5–4.5)
PLATELET # BLD AUTO: 455 K/UL — HIGH (ref 150–400)
PMV BLD: 9.8 FL — SIGNIFICANT CHANGE UP (ref 7–13)
POTASSIUM SERPL-MCNC: 5.3 MMOL/L — SIGNIFICANT CHANGE UP (ref 3.5–5.3)
POTASSIUM SERPL-SCNC: 5.3 MMOL/L — SIGNIFICANT CHANGE UP (ref 3.5–5.3)
PROCALCITONIN SERPL-MCNC: 0.05 NG/ML — SIGNIFICANT CHANGE UP (ref 0.02–0.1)
PROT SERPL-MCNC: 6.7 G/DL — SIGNIFICANT CHANGE UP (ref 6–8.3)
RBC # BLD: 6.39 M/UL — HIGH (ref 4.2–5.8)
RBC # FLD: 13.2 % — SIGNIFICANT CHANGE UP (ref 10.3–14.5)
SODIUM SERPL-SCNC: 133 MMOL/L — LOW (ref 135–145)
WBC # BLD: 16.2 K/UL — HIGH (ref 3.8–10.5)
WBC # FLD AUTO: 16.2 K/UL — HIGH (ref 3.8–10.5)

## 2020-04-16 PROCEDURE — 99233 SBSQ HOSP IP/OBS HIGH 50: CPT

## 2020-04-16 RX ORDER — ANAKINRA 100MG/0.67
100 SYRINGE (ML) SUBCUTANEOUS DAILY
Refills: 0 | Status: COMPLETED | OUTPATIENT
Start: 2020-04-16 | End: 2020-04-19

## 2020-04-16 RX ADMIN — Medication 100 MILLIGRAM(S): at 17:46

## 2020-04-16 RX ADMIN — Medication 500 MILLIGRAM(S): at 06:31

## 2020-04-16 RX ADMIN — ENOXAPARIN SODIUM 40 MILLIGRAM(S): 100 INJECTION SUBCUTANEOUS at 17:46

## 2020-04-16 RX ADMIN — ALBUTEROL 2 PUFF(S): 90 AEROSOL, METERED ORAL at 03:56

## 2020-04-16 RX ADMIN — ZINC SULFATE TAB 220 MG (50 MG ZINC EQUIVALENT) 220 MILLIGRAM(S): 220 (50 ZN) TAB at 11:30

## 2020-04-16 RX ADMIN — ALBUTEROL 2 PUFF(S): 90 AEROSOL, METERED ORAL at 10:31

## 2020-04-16 RX ADMIN — ALBUTEROL 2 PUFF(S): 90 AEROSOL, METERED ORAL at 20:37

## 2020-04-16 RX ADMIN — ENOXAPARIN SODIUM 40 MILLIGRAM(S): 100 INJECTION SUBCUTANEOUS at 06:31

## 2020-04-16 RX ADMIN — Medication 500 MILLIGRAM(S): at 17:46

## 2020-04-16 RX ADMIN — ALBUTEROL 2 PUFF(S): 90 AEROSOL, METERED ORAL at 17:45

## 2020-04-16 NOTE — PROGRESS NOTE ADULT - SUBJECTIVE AND OBJECTIVE BOX
[facility] Division of Hospital Medicine  Leighton Ross MD  Pager (M-F, 8A-5P): [phone]  Other Times:  [phone]    Patient is a 40y old  Male who presents with a chief complaint of Dyspnea (15 Apr 2020 13:19)    SUBJECTIVE / OVERNIGHT EVENTS:  Patient states that he feels better today.  Tolerating NC oxygen delievery for now.  No N/V, CP,  lightheadedness, dizziness, abdominal pain, diarrhea, dysuria.    MEDICATIONS  (STANDING):  ALBUTerol    90 MICROgram(s) HFA Inhaler 2 Puff(s) Inhalation every 6 hours  anakinra Injectable 100 milliGRAM(s) SubCutaneous daily  ascorbic acid 500 milliGRAM(s) Oral two times a day  enoxaparin Injectable 40 milliGRAM(s) SubCutaneous two times a day  zinc sulfate 220 milliGRAM(s) Oral daily    MEDICATIONS  (PRN):  acetaminophen   Tablet .. 650 milliGRAM(s) Oral every 4 hours PRN Temp greater or equal to 38.5C (101.3F)  benzonatate 100 milliGRAM(s) Oral three times a day PRN Cough      Vital Signs Last 24 Hrs  T(C): 37.3 (16 Apr 2020 12:11), Max: 37.3 (16 Apr 2020 12:11)  T(F): 99.2 (16 Apr 2020 12:11), Max: 99.2 (16 Apr 2020 12:11)  HR: 120 (16 Apr 2020 12:11) (99 - 120)  BP: 115/70 (16 Apr 2020 10:28) (115/70 - 120/84)  BP(mean): --  RR: 20 (16 Apr 2020 10:28) (20 - 22)  SpO2: 91% (16 Apr 2020 10:28) (91% - 95%)  CAPILLARY BLOOD GLUCOSE        I&O's Summary      PHYSICAL EXAM:  GENERAL: NAD  HEAD:  Atraumatic, Normocephalic  EYES: EOMI, PERRLA, conjunctiva and sclera clear  NECK: Supple, No JVD  CHEST/LUNG: Mild respiratory distress  ABDOMEN: Nondistended; Bowel sounds present  EXTREMITIES:  No clubbing, cyanosis, or edema  PSYCH: Calm  NEUROLOGY: A/Ox3, non-focal  SKIN: No rashes or lesions  LABS:                        18.6   16.20 )-----------( 455      ( 16 Apr 2020 08:02 )             55.1     04-16    133<L>  |  96<L>  |  23  ----------------------------<  139<H>  5.3   |  24  |  0.71    Ca    8.8      16 Apr 2020 11:35  Phos  3.3     04-16  Mg     2.2     04-16    TPro  6.7  /  Alb  3.1<L>  /  TBili  0.8  /  DBili  x   /  AST  24  /  ALT  27  /  AlkPhos  88  04-16              Procalcitonin, Serum: 0.05 ng/mL (04-16-20 @ 08:02)  Procalcitonin, Serum: 0.09 ng/mL (04-12-20 @ 07:00)  Procalcitonin, Serum: 0.18 ng/mL (04-10-20 @ 06:30)    C-Reactive Protein, Serum: 30.7 mg/L (04-14-20 @ 07:00)  C-Reactive Protein, Serum: 22.8 mg/L (04-12-20 @ 06:49)  C-Reactive Protein, Serum: 117.9 mg/L (04-10-20 @ 06:30)      D-Dimer Assay, Quantitative: 253 ng/mL (04-16-20 @ 11:35)  D-Dimer Assay, Quantitative: 277 ng/mL (04-14-20 @ 07:00)  D-Dimer Assay, Quantitative: 356 ng/mL (04-12-20 @ 06:49)  D-Dimer Assay, Quantitative: 459 ng/mL (04-10-20 @ 12:50)    Ferritin, Serum: 1289 ng/mL (04-14-20 @ 07:00)  Ferritin, Serum: 1288 ng/mL (04-13-20 @ 06:50)  Ferritin, Serum: 1170 ng/mL (04-12-20 @ 07:00)  Ferritin, Serum: 2138 ng/mL (04-10-20 @ 12:50)  Ferritin, Serum: 2127 ng/mL (04-10-20 @ 06:30)      RADIOLOGY & ADDITIONAL TESTS:    Imaging Personally Reviewed:    Care Discussed with Consultants/Other Providers:

## 2020-04-16 NOTE — PROGRESS NOTE ADULT - ASSESSMENT
41 y/o M with no pmh p/w sepsis POA from COVID19 PNA with hypoxia.  Acute hypoxic respiratory failure

## 2020-04-16 NOTE — PROGRESS NOTE ADULT - ATTENDING COMMENTS
d/w niece Nellie Fajardo [phone] 4/9, 4/10, 4/11, 4/12, 4/13, 4/14, 4/16 - answered all the questions.  Spoke with niece by bedside on 4/15. They are taking care of pt's 3yo daughter.

## 2020-04-17 LAB
ALBUMIN SERPL ELPH-MCNC: 2.1 G/DL — LOW (ref 3.3–5)
ALP SERPL-CCNC: 61 U/L — SIGNIFICANT CHANGE UP (ref 40–120)
ALT FLD-CCNC: 18 U/L — SIGNIFICANT CHANGE UP (ref 4–41)
ANION GAP SERPL CALC-SCNC: 10 MMO/L — SIGNIFICANT CHANGE UP (ref 7–14)
ANION GAP SERPL CALC-SCNC: 11 MMO/L — SIGNIFICANT CHANGE UP (ref 7–14)
AST SERPL-CCNC: 12 U/L — SIGNIFICANT CHANGE UP (ref 4–40)
BILIRUB SERPL-MCNC: 0.6 MG/DL — SIGNIFICANT CHANGE UP (ref 0.2–1.2)
BUN SERPL-MCNC: 15 MG/DL — SIGNIFICANT CHANGE UP (ref 7–23)
BUN SERPL-MCNC: 16 MG/DL — SIGNIFICANT CHANGE UP (ref 7–23)
CALCIUM SERPL-MCNC: 6.4 MG/DL — CRITICAL LOW (ref 8.4–10.5)
CALCIUM SERPL-MCNC: 9.1 MG/DL — SIGNIFICANT CHANGE UP (ref 8.4–10.5)
CHLORIDE SERPL-SCNC: 107 MMOL/L — SIGNIFICANT CHANGE UP (ref 98–107)
CHLORIDE SERPL-SCNC: 97 MMOL/L — LOW (ref 98–107)
CO2 SERPL-SCNC: 21 MMOL/L — LOW (ref 22–31)
CO2 SERPL-SCNC: 26 MMOL/L — SIGNIFICANT CHANGE UP (ref 22–31)
CREAT SERPL-MCNC: 0.48 MG/DL — LOW (ref 0.5–1.3)
CREAT SERPL-MCNC: 0.61 MG/DL — SIGNIFICANT CHANGE UP (ref 0.5–1.3)
GLUCOSE SERPL-MCNC: 105 MG/DL — HIGH (ref 70–99)
GLUCOSE SERPL-MCNC: 108 MG/DL — HIGH (ref 70–99)
HCT VFR BLD CALC: 53.7 % — HIGH (ref 39–50)
HGB BLD-MCNC: 17.5 G/DL — HIGH (ref 13–17)
MAGNESIUM SERPL-MCNC: 1.5 MG/DL — LOW (ref 1.6–2.6)
MAGNESIUM SERPL-MCNC: 2.2 MG/DL — SIGNIFICANT CHANGE UP (ref 1.6–2.6)
MCHC RBC-ENTMCNC: 28.3 PG — SIGNIFICANT CHANGE UP (ref 27–34)
MCHC RBC-ENTMCNC: 32.6 % — SIGNIFICANT CHANGE UP (ref 32–36)
MCV RBC AUTO: 86.8 FL — SIGNIFICANT CHANGE UP (ref 80–100)
NRBC # FLD: 0 K/UL — SIGNIFICANT CHANGE UP (ref 0–0)
PHOSPHATE SERPL-MCNC: 1.8 MG/DL — LOW (ref 2.5–4.5)
PHOSPHATE SERPL-MCNC: 2.8 MG/DL — SIGNIFICANT CHANGE UP (ref 2.5–4.5)
PLATELET # BLD AUTO: 418 K/UL — HIGH (ref 150–400)
PMV BLD: 9.8 FL — SIGNIFICANT CHANGE UP (ref 7–13)
POTASSIUM SERPL-MCNC: 3.3 MMOL/L — LOW (ref 3.5–5.3)
POTASSIUM SERPL-MCNC: 4.6 MMOL/L — SIGNIFICANT CHANGE UP (ref 3.5–5.3)
POTASSIUM SERPL-SCNC: 3.3 MMOL/L — LOW (ref 3.5–5.3)
POTASSIUM SERPL-SCNC: 4.6 MMOL/L — SIGNIFICANT CHANGE UP (ref 3.5–5.3)
PROT SERPL-MCNC: 4.8 G/DL — LOW (ref 6–8.3)
RBC # BLD: 6.19 M/UL — HIGH (ref 4.2–5.8)
RBC # FLD: 13 % — SIGNIFICANT CHANGE UP (ref 10.3–14.5)
SODIUM SERPL-SCNC: 133 MMOL/L — LOW (ref 135–145)
SODIUM SERPL-SCNC: 139 MMOL/L — SIGNIFICANT CHANGE UP (ref 135–145)
WBC # BLD: 14.27 K/UL — HIGH (ref 3.8–10.5)
WBC # FLD AUTO: 14.27 K/UL — HIGH (ref 3.8–10.5)

## 2020-04-17 PROCEDURE — 99233 SBSQ HOSP IP/OBS HIGH 50: CPT

## 2020-04-17 PROCEDURE — 93010 ELECTROCARDIOGRAM REPORT: CPT

## 2020-04-17 RX ADMIN — ENOXAPARIN SODIUM 40 MILLIGRAM(S): 100 INJECTION SUBCUTANEOUS at 17:38

## 2020-04-17 RX ADMIN — ALBUTEROL 2 PUFF(S): 90 AEROSOL, METERED ORAL at 09:19

## 2020-04-17 RX ADMIN — ENOXAPARIN SODIUM 40 MILLIGRAM(S): 100 INJECTION SUBCUTANEOUS at 05:00

## 2020-04-17 RX ADMIN — Medication 100 MILLIGRAM(S): at 12:45

## 2020-04-17 RX ADMIN — ZINC SULFATE TAB 220 MG (50 MG ZINC EQUIVALENT) 220 MILLIGRAM(S): 220 (50 ZN) TAB at 13:23

## 2020-04-17 RX ADMIN — ALBUTEROL 2 PUFF(S): 90 AEROSOL, METERED ORAL at 20:43

## 2020-04-17 RX ADMIN — ALBUTEROL 2 PUFF(S): 90 AEROSOL, METERED ORAL at 04:10

## 2020-04-17 RX ADMIN — ALBUTEROL 2 PUFF(S): 90 AEROSOL, METERED ORAL at 16:18

## 2020-04-17 RX ADMIN — Medication 500 MILLIGRAM(S): at 05:00

## 2020-04-17 RX ADMIN — Medication 500 MILLIGRAM(S): at 17:38

## 2020-04-17 NOTE — PROGRESS NOTE ADULT - PROBLEM SELECTOR PLAN 1
2/2 COVID 19 viral infection/pneumonia  - Plaquenil, steroids, Anakinra, proning, chest PT/IS/albuterol, titrate O2 as able  - full code  Lovenox SC for VTE prophylaxis. Inadequate oral intake

## 2020-04-17 NOTE — PROGRESS NOTE ADULT - ATTENDING COMMENTS
d/w niece Nellie Fajardo [phone] 4/9, 4/10, 4/11, 4/12, 4/13, 4/14, 4/16 - answered all the questions.  Spoke with niece by bedside on 4/15, 4/17. They are taking care of pt's 5yo daughter.

## 2020-04-17 NOTE — CHART NOTE - NSCHARTNOTEFT_GEN_A_CORE
Patient noted with tachycardia today - ECG done showing sinus tach @ 119bpm, 1mm JOVON lead 1 and avL (old but slightly more prominent), STD III (appears new), TWI III (old but more prominent). Patient seen and endorses feeling "ok" at the moment. Denies chest pain or cardiac complaints currently. Above findings and plan discussed with Dr. Ross - possible demand in the setting of hypoxemia on NRB (currently saturating 95%). Will continue to observe at this time and repeat EKG in AM; continue current management.

## 2020-04-17 NOTE — PROGRESS NOTE ADULT - SUBJECTIVE AND OBJECTIVE BOX
[facility] Division of Hospital Medicine  Leighton Ross MD  Pager (M-F, 8A-5P): [phone]  Other Times:  [phone]    Patient is a 40y old  Male who presents with a chief complaint of Dyspnea (16 Apr 2020 15:06)    SUBJECTIVE / OVERNIGHT EVENTS:  Patient offers no new complaints.  No N/V, CP,  lightheadedness, dizziness, abdominal pain, diarrhea, dysuria.    MEDICATIONS  (STANDING):  ALBUTerol    90 MICROgram(s) HFA Inhaler 2 Puff(s) Inhalation every 6 hours  anakinra Injectable 100 milliGRAM(s) SubCutaneous daily  ascorbic acid 500 milliGRAM(s) Oral two times a day  enoxaparin Injectable 40 milliGRAM(s) SubCutaneous two times a day  zinc sulfate 220 milliGRAM(s) Oral daily    MEDICATIONS  (PRN):  acetaminophen   Tablet .. 650 milliGRAM(s) Oral every 4 hours PRN Temp greater or equal to 38.5C (101.3F)  benzonatate 100 milliGRAM(s) Oral three times a day PRN Cough      Vital Signs Last 24 Hrs  T(C): 36.4 (17 Apr 2020 12:30), Max: 37.1 (16 Apr 2020 21:11)  T(F): 97.6 (17 Apr 2020 12:30), Max: 98.7 (16 Apr 2020 21:11)  HR: 119 (17 Apr 2020 12:30) (110 - 122)  BP: 122/80 (17 Apr 2020 12:30) (121/74 - 122/80)  BP(mean): --  RR: 22 (17 Apr 2020 12:30) (20 - 22)  SpO2: 95% (17 Apr 2020 12:30) (92% - 98%)  CAPILLARY BLOOD GLUCOSE        I&O's Summary      PHYSICAL EXAM:  GENERAL: NAD  HEAD:  Atraumatic, Normocephalic  EYES: EOMI, PERRLA, conjunctiva and sclera clear  NECK: Supple, No JVD  CHEST/LUNG: Mild respiratory distress  ABDOMEN: Nondistended; Bowel sounds present  EXTREMITIES:  No clubbing, cyanosis, or edema  PSYCH: Calm  NEUROLOGY: A/Ox3, non-focal  SKIN: No rashes or lesions    LABS:                        17.5   14.27 )-----------( 418      ( 17 Apr 2020 04:52 )             53.7     04-17    133<L>  |  97<L>  |  16  ----------------------------<  105<H>  4.6   |  26  |  0.61    Ca    9.1      17 Apr 2020 07:41  Phos  2.8     04-17  Mg     2.2     04-17    TPro  4.8<L>  /  Alb  2.1<L>  /  TBili  0.6  /  DBili  x   /  AST  12  /  ALT  18  /  AlkPhos  61  04-17              Procalcitonin, Serum: 0.05 ng/mL (04-16-20 @ 08:02)  Procalcitonin, Serum: 0.09 ng/mL (04-12-20 @ 07:00)    C-Reactive Protein, Serum: 30.7 mg/L (04-14-20 @ 07:00)  C-Reactive Protein, Serum: 22.8 mg/L (04-12-20 @ 06:49)      D-Dimer Assay, Quantitative: 253 ng/mL (04-16-20 @ 11:35)  D-Dimer Assay, Quantitative: 277 ng/mL (04-14-20 @ 07:00)  D-Dimer Assay, Quantitative: 356 ng/mL (04-12-20 @ 06:49)    Ferritin, Serum: 1356 ng/mL (04-16-20 @ 11:35)  Ferritin, Serum: 1289 ng/mL (04-14-20 @ 07:00)  Ferritin, Serum: 1288 ng/mL (04-13-20 @ 06:50)  Ferritin, Serum: 1170 ng/mL (04-12-20 @ 07:00)    COVID-19 PCR: Detected (04-09-20 @ 02:49)      RADIOLOGY & ADDITIONAL TESTS:    Imaging Personally Reviewed:    Care Discussed with Consultants/Other Providers:

## 2020-04-18 LAB
ALBUMIN SERPL ELPH-MCNC: 2.7 G/DL — LOW (ref 3.3–5)
ALP SERPL-CCNC: 92 U/L — SIGNIFICANT CHANGE UP (ref 40–120)
ALT FLD-CCNC: 30 U/L — SIGNIFICANT CHANGE UP (ref 4–41)
ANION GAP SERPL CALC-SCNC: 14 MMO/L — SIGNIFICANT CHANGE UP (ref 7–14)
AST SERPL-CCNC: 22 U/L — SIGNIFICANT CHANGE UP (ref 4–40)
BILIRUB SERPL-MCNC: 0.9 MG/DL — SIGNIFICANT CHANGE UP (ref 0.2–1.2)
BUN SERPL-MCNC: 16 MG/DL — SIGNIFICANT CHANGE UP (ref 7–23)
CALCIUM SERPL-MCNC: 9 MG/DL — SIGNIFICANT CHANGE UP (ref 8.4–10.5)
CHLORIDE SERPL-SCNC: 96 MMOL/L — LOW (ref 98–107)
CO2 SERPL-SCNC: 23 MMOL/L — SIGNIFICANT CHANGE UP (ref 22–31)
CREAT SERPL-MCNC: 0.62 MG/DL — SIGNIFICANT CHANGE UP (ref 0.5–1.3)
CRP SERPL-MCNC: 58.2 MG/L — HIGH
D DIMER BLD IA.RAPID-MCNC: 246 NG/ML — SIGNIFICANT CHANGE UP
FERRITIN SERPL-MCNC: 1107 NG/ML — HIGH (ref 30–400)
GLUCOSE SERPL-MCNC: 108 MG/DL — HIGH (ref 70–99)
HCT VFR BLD CALC: 52 % — HIGH (ref 39–50)
HGB BLD-MCNC: 17.2 G/DL — HIGH (ref 13–17)
LDH SERPL L TO P-CCNC: 575 U/L — HIGH (ref 135–225)
MAGNESIUM SERPL-MCNC: 2.2 MG/DL — SIGNIFICANT CHANGE UP (ref 1.6–2.6)
MCHC RBC-ENTMCNC: 28.5 PG — SIGNIFICANT CHANGE UP (ref 27–34)
MCHC RBC-ENTMCNC: 33.1 % — SIGNIFICANT CHANGE UP (ref 32–36)
MCV RBC AUTO: 86.2 FL — SIGNIFICANT CHANGE UP (ref 80–100)
NRBC # FLD: 0 K/UL — SIGNIFICANT CHANGE UP (ref 0–0)
PHOSPHATE SERPL-MCNC: 3.3 MG/DL — SIGNIFICANT CHANGE UP (ref 2.5–4.5)
PLATELET # BLD AUTO: 375 K/UL — SIGNIFICANT CHANGE UP (ref 150–400)
PMV BLD: 10 FL — SIGNIFICANT CHANGE UP (ref 7–13)
POTASSIUM SERPL-MCNC: 5 MMOL/L — SIGNIFICANT CHANGE UP (ref 3.5–5.3)
POTASSIUM SERPL-SCNC: 5 MMOL/L — SIGNIFICANT CHANGE UP (ref 3.5–5.3)
PROCALCITONIN SERPL-MCNC: 0.05 NG/ML — SIGNIFICANT CHANGE UP (ref 0.02–0.1)
PROT SERPL-MCNC: 6.9 G/DL — SIGNIFICANT CHANGE UP (ref 6–8.3)
RBC # BLD: 6.03 M/UL — HIGH (ref 4.2–5.8)
RBC # FLD: 13 % — SIGNIFICANT CHANGE UP (ref 10.3–14.5)
SODIUM SERPL-SCNC: 133 MMOL/L — LOW (ref 135–145)
WBC # BLD: 12.91 K/UL — HIGH (ref 3.8–10.5)
WBC # FLD AUTO: 12.91 K/UL — HIGH (ref 3.8–10.5)

## 2020-04-18 PROCEDURE — 99233 SBSQ HOSP IP/OBS HIGH 50: CPT

## 2020-04-18 RX ORDER — ALBUTEROL 90 UG/1
2 AEROSOL, METERED ORAL EVERY 6 HOURS
Refills: 0 | Status: DISCONTINUED | OUTPATIENT
Start: 2020-04-18 | End: 2020-04-29

## 2020-04-18 RX ADMIN — ALBUTEROL 2 PUFF(S): 90 AEROSOL, METERED ORAL at 10:03

## 2020-04-18 RX ADMIN — ENOXAPARIN SODIUM 40 MILLIGRAM(S): 100 INJECTION SUBCUTANEOUS at 17:34

## 2020-04-18 RX ADMIN — ALBUTEROL 2 PUFF(S): 90 AEROSOL, METERED ORAL at 04:13

## 2020-04-18 RX ADMIN — ALBUTEROL 2 PUFF(S): 90 AEROSOL, METERED ORAL at 16:10

## 2020-04-18 RX ADMIN — Medication 500 MILLIGRAM(S): at 17:34

## 2020-04-18 RX ADMIN — ZINC SULFATE TAB 220 MG (50 MG ZINC EQUIVALENT) 220 MILLIGRAM(S): 220 (50 ZN) TAB at 11:43

## 2020-04-18 RX ADMIN — ENOXAPARIN SODIUM 40 MILLIGRAM(S): 100 INJECTION SUBCUTANEOUS at 04:47

## 2020-04-18 RX ADMIN — Medication 100 MILLIGRAM(S): at 11:43

## 2020-04-18 RX ADMIN — Medication 500 MILLIGRAM(S): at 04:47

## 2020-04-18 NOTE — PROGRESS NOTE ADULT - SUBJECTIVE AND OBJECTIVE BOX
[facility] Division of Hospital Medicine  Cheryl Montes MD  Pager (M-F, 8A-5P):  In-house pager [phone]; Long-range pager [phone]  Other Times:  Please page Hospitalist in Charge -  In-house pager [phone]    Patient is a 40y old  Male who presents with a chief complaint of Dyspnea (17 Apr 2020 15:41)      SUBJECTIVE / OVERNIGHT EVENTS: ...  ADDITIONAL REVIEW OF SYSTEMS:    MEDICATIONS  (STANDING):  ALBUTerol    90 MICROgram(s) HFA Inhaler 2 Puff(s) Inhalation every 6 hours  anakinra Injectable 100 milliGRAM(s) SubCutaneous daily  ascorbic acid 500 milliGRAM(s) Oral two times a day  enoxaparin Injectable 40 milliGRAM(s) SubCutaneous two times a day  zinc sulfate 220 milliGRAM(s) Oral daily    MEDICATIONS  (PRN):  acetaminophen   Tablet .. 650 milliGRAM(s) Oral every 4 hours PRN Temp greater or equal to 38.5C (101.3F)  benzonatate 100 milliGRAM(s) Oral three times a day PRN Cough      CAPILLARY BLOOD GLUCOSE        I&O's Summary    17 Apr 2020 07:01  -  18 Apr 2020 07:00  --------------------------------------------------------  IN: 0 mL / OUT: 450 mL / NET: -450 mL        PHYSICAL EXAM:  Vital Signs Last 24 Hrs  T(C): 36.3 (18 Apr 2020 05:00), Max: 36.7 (17 Apr 2020 22:00)  T(F): 97.4 (18 Apr 2020 05:00), Max: 98.1 (17 Apr 2020 22:00)  HR: 102 (18 Apr 2020 10:04) (102 - 119)  BP: 118/77 (18 Apr 2020 05:00) (118/77 - 127/69)  BP(mean): --  RR: 19 (18 Apr 2020 05:00) (19 - 22)  SpO2: 98% (18 Apr 2020 05:00) (95% - 100%)    GENERAL: ...  HEAD:  Atraumatic, Normocephalic  EYES: EOMI, PERRLA, injected conjunctiva b/l  NECK: Supple, No JVD  CHEST/LUNG: clear  ABDOMEN: Nondistended; Bowel sounds present  EXTREMITIES:  No clubbing, cyanosis, or edema  PSYCH: Calm  NEUROLOGY: A/Ox3, non-focal  SKIN: No rashes or lesions    LABS:                        17.2   12.91 )-----------( 375      ( 18 Apr 2020 05:09 )             52.0     04-18    133<L>  |  96<L>  |  16  ----------------------------<  108<H>  5.0   |  23  |  0.62    Ca    9.0      18 Apr 2020 05:09  Phos  3.3     04-18  Mg     2.2     04-18    TPro  6.9  /  Alb  2.7<L>  /  TBili  0.9  /  DBili  x   /  AST  22  /  ALT  30  /  AlkPhos  92  04-18                RADIOLOGY & ADDITIONAL TESTS:  Results Reviewed:   Imaging Personally Reviewed:  Electrocardiogram Personally Reviewed:    COORDINATION OF CARE:  Care Discussed with Consultants/Other Providers [Y/N]: RN, SW, CM, ACP re overall care   Prior or Outpatient Records Reviewed [Y/N]: [facility] Division of Hospital Medicine  Cheryl Montes MD  Pager (M-F, 8A-5P):  In-house pager [phone]; Long-range pager [phone]  Other Times:  Please page Hospitalist in Charge -  In-house pager [phone]    Patient is a 40y old  Male who presents with a chief complaint of Dyspnea (17 Apr 2020 15:41)      SUBJECTIVE / OVERNIGHT EVENTS: Pt seen earlier, sitting up in bed, talking on phone.  Says breathing better, no pain/diarrhea.  Eating/drinking food from home.  ADDITIONAL REVIEW OF SYSTEMS:    MEDICATIONS  (STANDING):  ALBUTerol    90 MICROgram(s) HFA Inhaler 2 Puff(s) Inhalation every 6 hours  anakinra Injectable 100 milliGRAM(s) SubCutaneous daily  ascorbic acid 500 milliGRAM(s) Oral two times a day  enoxaparin Injectable 40 milliGRAM(s) SubCutaneous two times a day  zinc sulfate 220 milliGRAM(s) Oral daily    MEDICATIONS  (PRN):  acetaminophen   Tablet .. 650 milliGRAM(s) Oral every 4 hours PRN Temp greater or equal to 38.5C (101.3F)  benzonatate 100 milliGRAM(s) Oral three times a day PRN Cough      CAPILLARY BLOOD GLUCOSE        I&O's Summary    17 Apr 2020 07:01  -  18 Apr 2020 07:00  --------------------------------------------------------  IN: 0 mL / OUT: 450 mL / NET: -450 mL        PHYSICAL EXAM:  Vital Signs Last 24 Hrs  T(C): 36.3 (18 Apr 2020 05:00), Max: 36.7 (17 Apr 2020 22:00)  T(F): 97.4 (18 Apr 2020 05:00), Max: 98.1 (17 Apr 2020 22:00)  HR: 102 (18 Apr 2020 10:04) (102 - 119)  BP: 118/77 (18 Apr 2020 05:00) (118/77 - 127/69)  BP(mean): --  RR: 19 (18 Apr 2020 05:00) (19 - 22)  SpO2: 98% (18 Apr 2020 05:00) (95% - 100%)    GENERAL: sitting up in bed, NRB, NAD  HEAD:  Atraumatic, Normocephalic  EYES: EOMI, PERRLA, injected conjunctiva b/l  NECK: Supple, No JVD  CHEST/LUNG: clear  ABDOMEN: Nondistended; Bowel sounds present  EXTREMITIES:  No clubbing, cyanosis, or edema  PSYCH: Calm  NEUROLOGY: A/Ox3, non-focal  SKIN: No rashes or lesions    LABS:                        17.2   12.91 )-----------( 375      ( 18 Apr 2020 05:09 )             52.0     04-18    133<L>  |  96<L>  |  16  ----------------------------<  108<H>  5.0   |  23  |  0.62    Ca    9.0      18 Apr 2020 05:09  Phos  3.3     04-18  Mg     2.2     04-18    TPro  6.9  /  Alb  2.7<L>  /  TBili  0.9  /  DBili  x   /  AST  22  /  ALT  30  /  AlkPhos  92  04-18                RADIOLOGY & ADDITIONAL TESTS:  Results Reviewed:   Imaging Personally Reviewed:  Electrocardiogram Personally Reviewed:    COORDINATION OF CARE:  Care Discussed with Consultants/Other Providers [Y/N]: RN, SW, CM, ACP re overall care   Prior or Outpatient Records Reviewed [Y/N]:

## 2020-04-18 NOTE — PROGRESS NOTE ADULT - ATTENDING COMMENTS
d/w niece Nellie Fajardo [phone] 4/9, 4/10, 4/11, 4/12, 4/13, 4/14, 4/16 - answered all the questions.  Spoke with niece by bedside on 4/15, 4/17. They are taking care of pt's 3yo daughter. d/w niece Nellie Fajardo [phone] 4/9, 4/10, 4/11, 4/12, 4/13, 4/14, 4/16, 4/18 - answered all the questions.  Spoke with niece by bedside on 4/15, 4/17. They are taking care of pt's 3yo daughter.

## 2020-04-18 NOTE — PROGRESS NOTE ADULT - PROBLEM SELECTOR PLAN 1
2/2 COVID 19 viral infection/pneumonia  - Plaquenil, steroids, Anakinra, proning, chest PT/IS/albuterol, titrate O2 as able  - full code  Lovenox SC for VTE prophylaxis. 2/2 COVID 19 viral infection/pneumonia  - s/p Plaquenil, steroids, proning, chest PT/IS/albuterol, titrate O2 as able  - completing Anakinra - Ddimer  623 --> 246, ferritin 2391 --> 1107 both decreasing, CRP up though;  Lovenox 40 bid SC for VTE prophylaxis.  - full code

## 2020-04-19 LAB
ALBUMIN SERPL ELPH-MCNC: 3 G/DL — LOW (ref 3.3–5)
ALP SERPL-CCNC: 90 U/L — SIGNIFICANT CHANGE UP (ref 40–120)
ALT FLD-CCNC: 30 U/L — SIGNIFICANT CHANGE UP (ref 4–41)
ANION GAP SERPL CALC-SCNC: 10 MMO/L — SIGNIFICANT CHANGE UP (ref 7–14)
AST SERPL-CCNC: 23 U/L — SIGNIFICANT CHANGE UP (ref 4–40)
BILIRUB SERPL-MCNC: 0.6 MG/DL — SIGNIFICANT CHANGE UP (ref 0.2–1.2)
BUN SERPL-MCNC: 17 MG/DL — SIGNIFICANT CHANGE UP (ref 7–23)
CALCIUM SERPL-MCNC: 9.2 MG/DL — SIGNIFICANT CHANGE UP (ref 8.4–10.5)
CHLORIDE SERPL-SCNC: 97 MMOL/L — LOW (ref 98–107)
CO2 SERPL-SCNC: 25 MMOL/L — SIGNIFICANT CHANGE UP (ref 22–31)
CREAT SERPL-MCNC: 0.57 MG/DL — SIGNIFICANT CHANGE UP (ref 0.5–1.3)
GLUCOSE SERPL-MCNC: 128 MG/DL — HIGH (ref 70–99)
HCT VFR BLD CALC: 51.1 % — HIGH (ref 39–50)
HGB BLD-MCNC: 17 G/DL — SIGNIFICANT CHANGE UP (ref 13–17)
MAGNESIUM SERPL-MCNC: 2.1 MG/DL — SIGNIFICANT CHANGE UP (ref 1.6–2.6)
MCHC RBC-ENTMCNC: 29 PG — SIGNIFICANT CHANGE UP (ref 27–34)
MCHC RBC-ENTMCNC: 33.3 % — SIGNIFICANT CHANGE UP (ref 32–36)
MCV RBC AUTO: 87.1 FL — SIGNIFICANT CHANGE UP (ref 80–100)
NRBC # FLD: 0 K/UL — SIGNIFICANT CHANGE UP (ref 0–0)
PHOSPHATE SERPL-MCNC: 3.3 MG/DL — SIGNIFICANT CHANGE UP (ref 2.5–4.5)
PLATELET # BLD AUTO: 309 K/UL — SIGNIFICANT CHANGE UP (ref 150–400)
PMV BLD: 9.7 FL — SIGNIFICANT CHANGE UP (ref 7–13)
POTASSIUM SERPL-MCNC: 4.4 MMOL/L — SIGNIFICANT CHANGE UP (ref 3.5–5.3)
POTASSIUM SERPL-SCNC: 4.4 MMOL/L — SIGNIFICANT CHANGE UP (ref 3.5–5.3)
PROT SERPL-MCNC: 6.9 G/DL — SIGNIFICANT CHANGE UP (ref 6–8.3)
RBC # BLD: 5.87 M/UL — HIGH (ref 4.2–5.8)
RBC # FLD: 12.9 % — SIGNIFICANT CHANGE UP (ref 10.3–14.5)
SODIUM SERPL-SCNC: 132 MMOL/L — LOW (ref 135–145)
WBC # BLD: 11.54 K/UL — HIGH (ref 3.8–10.5)
WBC # FLD AUTO: 11.54 K/UL — HIGH (ref 3.8–10.5)

## 2020-04-19 PROCEDURE — 99233 SBSQ HOSP IP/OBS HIGH 50: CPT

## 2020-04-19 PROCEDURE — 93010 ELECTROCARDIOGRAM REPORT: CPT

## 2020-04-19 RX ADMIN — Medication 500 MILLIGRAM(S): at 04:50

## 2020-04-19 RX ADMIN — ENOXAPARIN SODIUM 40 MILLIGRAM(S): 100 INJECTION SUBCUTANEOUS at 04:50

## 2020-04-19 RX ADMIN — ZINC SULFATE TAB 220 MG (50 MG ZINC EQUIVALENT) 220 MILLIGRAM(S): 220 (50 ZN) TAB at 11:23

## 2020-04-19 RX ADMIN — Medication 500 MILLIGRAM(S): at 18:20

## 2020-04-19 RX ADMIN — ENOXAPARIN SODIUM 40 MILLIGRAM(S): 100 INJECTION SUBCUTANEOUS at 18:20

## 2020-04-19 RX ADMIN — Medication 100 MILLIGRAM(S): at 11:22

## 2020-04-19 NOTE — PROGRESS NOTE ADULT - ATTENDING COMMENTS
d/w niece Nellie Fajardo [phone] 4/9, 4/10, 4/11, 4/12, 4/13, 4/14, 4/16, 4/18 - answered all the questions.  Spoke with niece by bedside on 4/15, 4/17. They are taking care of pt's 5yo daughter. d/w ronny Fajardo [phone] 4/9, 4/10, 4/11, 4/12, 4/13, 4/14, 4/16, 4/18 - answered all the questions.  Spoke with niece by bedside on 4/15, 4/17. They are taking care of pt's 5yo daughter.  Left message on cell 4/19.

## 2020-04-19 NOTE — PROGRESS NOTE ADULT - PROBLEM SELECTOR PLAN 1
2/2 COVID 19 viral infection/pneumonia  - s/p Plaquenil, steroids, proning, chest PT/IS/albuterol, titrate O2 as able  - completing Anakinra - Ddimer  623 --> 246, ferritin 2391 --> 1107 both decreasing, CRP up though;  Lovenox 40 bid SC for VTE prophylaxis.  - full code 2/2 COVID 19 viral infection/pneumonia  - s/p Plaquenil, steroids, proning, chest PT/IS/albuterol, titrate O2 as able  - completed Anakinra - Ddimer  623 --> 246, ferritin 2391 --> 1107 both decreasing, CRP up though;  Lovenox 40 bid SC for VTE prophylaxis.  - full code

## 2020-04-19 NOTE — PROGRESS NOTE ADULT - SUBJECTIVE AND OBJECTIVE BOX
[facility] Division of Hospital Medicine  Cheryl Montes MD  Pager (M-F, 8A-5P):  In-house pager [phone]; Long-range pager [phone]  Other Times:  Please page Hospitalist in Charge -  In-house pager [phone]    Patient is a 40y old  Male who presents with a chief complaint of Dyspnea (18 Apr 2020 10:30)      SUBJECTIVE / OVERNIGHT EVENTS: ...  ADDITIONAL REVIEW OF SYSTEMS:    MEDICATIONS  (STANDING):  anakinra Injectable 100 milliGRAM(s) SubCutaneous daily  ascorbic acid 500 milliGRAM(s) Oral two times a day  enoxaparin Injectable 40 milliGRAM(s) SubCutaneous two times a day  zinc sulfate 220 milliGRAM(s) Oral daily    MEDICATIONS  (PRN):  acetaminophen   Tablet .. 650 milliGRAM(s) Oral every 4 hours PRN Temp greater or equal to 38.5C (101.3F)  ALBUTerol    90 MICROgram(s) HFA Inhaler 2 Puff(s) Inhalation every 6 hours PRN Shortness of Breath and/or Wheezing  benzonatate 100 milliGRAM(s) Oral three times a day PRN Cough      CAPILLARY BLOOD GLUCOSE        I&O's Summary      PHYSICAL EXAM:  Vital Signs Last 24 Hrs  T(C): 36.6 (19 Apr 2020 04:49), Max: 36.6 (19 Apr 2020 04:49)  T(F): 97.9 (19 Apr 2020 04:49), Max: 97.9 (19 Apr 2020 04:49)  HR: 110 (19 Apr 2020 04:49) (102 - 120)  BP: 120/78 (19 Apr 2020 04:49) (116/76 - 120/78)  BP(mean): --  RR: 20 (19 Apr 2020 07:46) (20 - 20)  SpO2: 94% (19 Apr 2020 07:46) (94% - 98%)    GENERAL: sitting up in bed, NRB, NAD  HEAD:  Atraumatic, Normocephalic  EYES: EOMI, PERRLA, injected conjunctiva b/l  NECK: Supple, No JVD  CHEST/LUNG: clear  ABDOMEN: Nondistended; Bowel sounds present  EXTREMITIES:  No clubbing, cyanosis, or edema  PSYCH: Calm  NEUROLOGY: A/Ox3, non-focal  SKIN: No rashes or lesions    LABS:                        17.0   11.54 )-----------( 309      ( 19 Apr 2020 05:30 )             51.1     04-19    132<L>  |  97<L>  |  17  ----------------------------<  128<H>  4.4   |  25  |  0.57    Ca    9.2      19 Apr 2020 05:30  Phos  3.3     04-19  Mg     2.1     04-19    TPro  6.9  /  Alb  3.0<L>  /  TBili  0.6  /  DBili  x   /  AST  23  /  ALT  30  /  AlkPhos  90  04-19                RADIOLOGY & ADDITIONAL TESTS:  Results Reviewed:   Imaging Personally Reviewed:  Electrocardiogram Personally Reviewed:    COORDINATION OF CARE:  Care Discussed with Consultants/Other Providers [Y/N]: RN, SW, CM, ACP re overall care   Prior or Outpatient Records Reviewed [Y/N]: [facility] Division of Hospital Medicine  Cheryl Montes MD  Pager (M-F, 8A-5P):  In-house pager [phone]; Long-range pager [phone]  Other Times:  Please page Hospitalist in Charge -  In-house pager [phone]    Patient is a 40y old  Male who presents with a chief complaint of Dyspnea (18 Apr 2020 10:30)      SUBJECTIVE / OVERNIGHT EVENTS: Pt seen earlier, breathing better.  Down from 15 --> 10L on NRB.  No pain.  ADDITIONAL REVIEW OF SYSTEMS:    MEDICATIONS  (STANDING):  anakinra Injectable 100 milliGRAM(s) SubCutaneous daily  ascorbic acid 500 milliGRAM(s) Oral two times a day  enoxaparin Injectable 40 milliGRAM(s) SubCutaneous two times a day  zinc sulfate 220 milliGRAM(s) Oral daily    MEDICATIONS  (PRN):  acetaminophen   Tablet .. 650 milliGRAM(s) Oral every 4 hours PRN Temp greater or equal to 38.5C (101.3F)  ALBUTerol    90 MICROgram(s) HFA Inhaler 2 Puff(s) Inhalation every 6 hours PRN Shortness of Breath and/or Wheezing  benzonatate 100 milliGRAM(s) Oral three times a day PRN Cough      CAPILLARY BLOOD GLUCOSE        I&O's Summary      PHYSICAL EXAM:  Vital Signs Last 24 Hrs  T(C): 36.6 (19 Apr 2020 04:49), Max: 36.6 (19 Apr 2020 04:49)  T(F): 97.9 (19 Apr 2020 04:49), Max: 97.9 (19 Apr 2020 04:49)  HR: 110 (19 Apr 2020 04:49) (102 - 120)  BP: 120/78 (19 Apr 2020 04:49) (116/76 - 120/78)  BP(mean): --  RR: 20 (19 Apr 2020 07:46) (20 - 20)  SpO2: 94% (19 Apr 2020 07:46) (94% - 98%)    GENERAL: sitting up in bed, NRB, NAD  HEAD:  Atraumatic, Normocephalic  EYES: EOMI, PERRLA, injected conjunctiva b/l  NECK: Supple, No JVD  CHEST/LUNG: clear  ABDOMEN: Nondistended; Bowel sounds present  EXTREMITIES:  No clubbing, cyanosis, or edema  PSYCH: Calm  NEUROLOGY: A/Ox3, non-focal  SKIN: No rashes or lesions    LABS:                        17.0   11.54 )-----------( 309      ( 19 Apr 2020 05:30 )             51.1     04-19    132<L>  |  97<L>  |  17  ----------------------------<  128<H>  4.4   |  25  |  0.57    Ca    9.2      19 Apr 2020 05:30  Phos  3.3     04-19  Mg     2.1     04-19    TPro  6.9  /  Alb  3.0<L>  /  TBili  0.6  /  DBili  x   /  AST  23  /  ALT  30  /  AlkPhos  90  04-19                RADIOLOGY & ADDITIONAL TESTS:  Results Reviewed:   Imaging Personally Reviewed:  Electrocardiogram Personally Reviewed:    COORDINATION OF CARE:  Care Discussed with Consultants/Other Providers [Y/N]: RN, SW, CM, ACP re overall care   Prior or Outpatient Records Reviewed [Y/N]:

## 2020-04-20 LAB
ALBUMIN SERPL ELPH-MCNC: 3 G/DL — LOW (ref 3.3–5)
ALP SERPL-CCNC: 85 U/L — SIGNIFICANT CHANGE UP (ref 40–120)
ALT FLD-CCNC: 32 U/L — SIGNIFICANT CHANGE UP (ref 4–41)
ANION GAP SERPL CALC-SCNC: 13 MMO/L — SIGNIFICANT CHANGE UP (ref 7–14)
AST SERPL-CCNC: 21 U/L — SIGNIFICANT CHANGE UP (ref 4–40)
BILIRUB SERPL-MCNC: 0.6 MG/DL — SIGNIFICANT CHANGE UP (ref 0.2–1.2)
BLD GP AB SCN SERPL QL: NEGATIVE — SIGNIFICANT CHANGE UP
BUN SERPL-MCNC: 14 MG/DL — SIGNIFICANT CHANGE UP (ref 7–23)
CALCIUM SERPL-MCNC: 9.2 MG/DL — SIGNIFICANT CHANGE UP (ref 8.4–10.5)
CHLORIDE SERPL-SCNC: 97 MMOL/L — LOW (ref 98–107)
CO2 SERPL-SCNC: 25 MMOL/L — SIGNIFICANT CHANGE UP (ref 22–31)
CREAT SERPL-MCNC: 0.63 MG/DL — SIGNIFICANT CHANGE UP (ref 0.5–1.3)
FERRITIN SERPL-MCNC: 880.7 NG/ML — HIGH (ref 30–400)
GLUCOSE SERPL-MCNC: 115 MG/DL — HIGH (ref 70–99)
HCT VFR BLD CALC: 50.4 % — HIGH (ref 39–50)
HGB BLD-MCNC: 16.8 G/DL — SIGNIFICANT CHANGE UP (ref 13–17)
MAGNESIUM SERPL-MCNC: 2 MG/DL — SIGNIFICANT CHANGE UP (ref 1.6–2.6)
MCHC RBC-ENTMCNC: 29 PG — SIGNIFICANT CHANGE UP (ref 27–34)
MCHC RBC-ENTMCNC: 33.3 % — SIGNIFICANT CHANGE UP (ref 32–36)
MCV RBC AUTO: 87 FL — SIGNIFICANT CHANGE UP (ref 80–100)
NRBC # FLD: 0 K/UL — SIGNIFICANT CHANGE UP (ref 0–0)
PHOSPHATE SERPL-MCNC: 3.3 MG/DL — SIGNIFICANT CHANGE UP (ref 2.5–4.5)
PLATELET # BLD AUTO: 327 K/UL — SIGNIFICANT CHANGE UP (ref 150–400)
PMV BLD: 10.5 FL — SIGNIFICANT CHANGE UP (ref 7–13)
POTASSIUM SERPL-MCNC: 4.4 MMOL/L — SIGNIFICANT CHANGE UP (ref 3.5–5.3)
POTASSIUM SERPL-SCNC: 4.4 MMOL/L — SIGNIFICANT CHANGE UP (ref 3.5–5.3)
PROT SERPL-MCNC: 7.1 G/DL — SIGNIFICANT CHANGE UP (ref 6–8.3)
RBC # BLD: 5.79 M/UL — SIGNIFICANT CHANGE UP (ref 4.2–5.8)
RBC # FLD: 13 % — SIGNIFICANT CHANGE UP (ref 10.3–14.5)
RH IG SCN BLD-IMP: POSITIVE — SIGNIFICANT CHANGE UP
SODIUM SERPL-SCNC: 135 MMOL/L — SIGNIFICANT CHANGE UP (ref 135–145)
WBC # BLD: 10.22 K/UL — SIGNIFICANT CHANGE UP (ref 3.8–10.5)
WBC # FLD AUTO: 10.22 K/UL — SIGNIFICANT CHANGE UP (ref 3.8–10.5)

## 2020-04-20 PROCEDURE — 99233 SBSQ HOSP IP/OBS HIGH 50: CPT

## 2020-04-20 PROCEDURE — 99223 1ST HOSP IP/OBS HIGH 75: CPT

## 2020-04-20 RX ORDER — PANTOPRAZOLE SODIUM 20 MG/1
40 TABLET, DELAYED RELEASE ORAL ONCE
Refills: 0 | Status: COMPLETED | OUTPATIENT
Start: 2020-04-20 | End: 2020-04-21

## 2020-04-20 RX ORDER — ENOXAPARIN SODIUM 100 MG/ML
90 INJECTION SUBCUTANEOUS EVERY 12 HOURS
Refills: 0 | Status: DISCONTINUED | OUTPATIENT
Start: 2020-04-20 | End: 2020-04-29

## 2020-04-20 RX ORDER — PANTOPRAZOLE SODIUM 20 MG/1
40 TABLET, DELAYED RELEASE ORAL
Refills: 0 | Status: DISCONTINUED | OUTPATIENT
Start: 2020-04-21 | End: 2020-04-29

## 2020-04-20 RX ORDER — ENOXAPARIN SODIUM 100 MG/ML
50 INJECTION SUBCUTANEOUS ONCE
Refills: 0 | Status: COMPLETED | OUTPATIENT
Start: 2020-04-20 | End: 2020-04-20

## 2020-04-20 RX ADMIN — Medication 500 MILLIGRAM(S): at 05:22

## 2020-04-20 RX ADMIN — ENOXAPARIN SODIUM 40 MILLIGRAM(S): 100 INJECTION SUBCUTANEOUS at 05:22

## 2020-04-20 RX ADMIN — Medication 500 MILLIGRAM(S): at 17:28

## 2020-04-20 RX ADMIN — ENOXAPARIN SODIUM 90 MILLIGRAM(S): 100 INJECTION SUBCUTANEOUS at 17:28

## 2020-04-20 RX ADMIN — ENOXAPARIN SODIUM 50 MILLIGRAM(S): 100 INJECTION SUBCUTANEOUS at 12:29

## 2020-04-20 RX ADMIN — ALBUTEROL 2 PUFF(S): 90 AEROSOL, METERED ORAL at 17:29

## 2020-04-20 RX ADMIN — ZINC SULFATE TAB 220 MG (50 MG ZINC EQUIVALENT) 220 MILLIGRAM(S): 220 (50 ZN) TAB at 12:29

## 2020-04-20 NOTE — CONSULT NOTE ADULT - SUBJECTIVE AND OBJECTIVE BOX
HPI:  41 y/o M  with no PMH     He presented on 4/9 with fever, cough and shortness of breath.  Symptoms of one week duration at thtat time.     At home, oxygen saturation was 76%.     Treated witha course of plaquenil.  S/p coiurse steroids and anakindra.     Has required NRB since 4/9.    He does state that he feels a little better today.         PAST MEDICAL & SURGICAL HISTORY:  No pertinent past medical history  No significant past surgical history      Allergies    No Known Allergies    Intolerances        ANTIMICROBIALS:      OTHER MEDS:  acetaminophen   Tablet .. 650 milliGRAM(s) Oral every 4 hours PRN  ALBUTerol    90 MICROgram(s) HFA Inhaler 2 Puff(s) Inhalation every 6 hours PRN  ascorbic acid 500 milliGRAM(s) Oral two times a day  benzonatate 100 milliGRAM(s) Oral three times a day PRN  enoxaparin Injectable 90 milliGRAM(s) SubCutaneous every 12 hours  zinc sulfate 220 milliGRAM(s) Oral daily      SOCIAL HISTORY:  from [facility]      FAMILY HISTORY:  No pertinent family history in first degree relatives      REVIEW OF SYSTEMS  [  ] ROS unobtainable because:    [x  ] All other systems negative except as noted below:	    Constitutional:  [x ] fever [ ] chills  [ ] weight loss  [ x] weakness  Skin:  [ ] rash [ ] phlebitis	  Eyes: [ ] icterus [ ] pain  [ ] discharge	  ENMT: [ ] sore throat  [ ] thrush [ ] ulcers [ ] exudates  Respiratory: [x ] dyspnea [ ] hemoptysis [ ] cough [ ] sputum	  Cardiovascular:  [ ] chest pain [ ] palpitations [ ] edema	  Gastrointestinal:  [ ] nausea [ ] vomiting [ ] diarrhea [ ] constipation [ ] pain	  Genitourinary:  [ ] dysuria [ ] frequency [ ] hematuria [ ] discharge [ ] flank pain  [ ] incontinence  Musculoskeletal:  [ ] myalgias [ ] arthralgias [ ] arthritis  [ ] back pain  Neurological:  [ ] headache [ ] seizures  [ ] confusion/altered mental status  Psychiatric:  [ ] anxiety [ ] depression	  Hematology/Lymphatics:  [ ] lymphadenopathy  Endocrine:  [ ] adrenal [ ] thyroid  Allergic/Immunologic:	 [ ] transplant [ ] seasonal    PHYSICAL EXAM:  General: [x ]NRB  HEAD/EYES: [ ] PERRL [x ] white sclera [ ] icterus  ENT:  [ ] normal [ x] supple [ ] thrush [ ] pharyngeal exudate  Cardiovascular:   [ ] murmur [ x] normal [ ] PPM/AICD  Respiratory:  [x ]course BS  GI:  x[ ] soft, non-tender, normal bowel sounds  :  [ ] mansfield [x ] no CVA tenderness   Musculoskeletal:  [ ] no synovitis  Neurologic:  [ ] non-focal exam   Skin:  x[ ] no rash  Lymph: [ ] no lymphadenopathy  Psychiatric:  [ ] appropriate affect [x ] alert & oriented  Lines:  [ ] no phlebitis [ ] central line          Drug Dosing Weight    Weight (kg): 90.1 (09 Apr 2020 08:20)    Vital Signs Last 24 Hrs  T(F): 97.6 (04-20-20 @ 12:30), Max: 99.8 (04-14-20 @ 07:15)    Vital Signs Last 24 Hrs  HR: 118 (04-20-20 @ 12:30) (115 - 122)  BP: 116/76 (04-20-20 @ 12:30) (116/76 - 121/66)  RR: 20 (04-20-20 @ 12:30)  SpO2: 95% (04-20-20 @ 12:30) (95% - 99%)  Wt(kg): --                          16.8   10.22 )-----------( 327      ( 20 Apr 2020 05:55 )             50.4       04-20    135  |  97<L>  |  14  ----------------------------<  115<H>  4.4   |  25  |  0.63    Ca    9.2      20 Apr 2020 05:55  Phos  3.3     04-20  Mg     2.0     04-20    TPro  7.1  /  Alb  3.0<L>  /  TBili  0.6  /  DBili  x   /  AST  21  /  ALT  32  /  AlkPhos  85  04-20          MICROBIOLOGY:  COVID-19 PCR . (04.09.20 @ 02:49)    COVID-19 PCR: Detected: This test has been validated by [employer] to be accurate;  though it has not been FDA cleared/approved by the usual pathway.  As with all laboratory tests, results should be correlated with clinical  findings.  https://www.fda.gov/media/174619/download  https://www.fda.gov/media/697830/download        RADIOLOGY:  < from: Xray Chest 1 View- PORTABLE-Urgent (04.09.20 @ 00:58) >  IMPRESSION:  Patchy/hazy bilateral opacities. Pattern suggests infection including atypical pneumonia/viral infection from atypical agents including COVID-19.    < end of copied text >

## 2020-04-20 NOTE — CONSULT NOTE ADULT - ASSESSMENT
40 year old with COVID multifocal pneumonia  complicated by hypoxia.    S/p Plaquenil    S/p full dose anticoagulation- empirically for PE    Prior course steroid anakinra    If not improving, consider eval for convalescent serum study.     Will follow intermittently  Call ID service with questions.

## 2020-04-20 NOTE — PROGRESS NOTE ADULT - PROBLEM SELECTOR PLAN 1
2/2 COVID 19 viral infection/pneumonia  - s/p Plaquenil, steroids, proning, chest PT/IS/albuterol, titrate O2 as able  - completed Anakinra - Ddimer  623 --> 246, ferritin 2391 --> 1107 both decreasing, CRP up though;  Lovenox 40 bid SC for VTE prophylaxis.  - full code 2/2 COVID 19 viral infection/pneumonia  - s/p Plaquenil, steroids, proning, chest PT/IS/albuterol, titrate O2 as able  - completed Anakinra - Ddimer  623 --> 246, ferritin 2391 --> 1107 both decreasing, CRP up though;    - has been Lovenox 40 bid SC for VTE prophylaxis, given persistent tachycardia, even though Ddimer not significantly elevate will start full dose a/c - d/w pt and niece - he has not h/o bleeding or clotting.  - check T&S, being evaluated for possible plasma study  - full code

## 2020-04-20 NOTE — PROGRESS NOTE ADULT - ATTENDING COMMENTS
d/w ronny Fajardo [phone] 4/9, 4/10, 4/11, 4/12, 4/13, 4/14, 4/16, 4/18 - answered all the questions.  Spoke with niece by bedside on 4/15, 4/17. They are taking care of pt's 5yo daughter.  Left message on cell 4/19. d/w ronny Fajardo [phone] 4/9, 4/10, 4/11, 4/12, 4/13, 4/14, 4/16, 4/18 - answered all the questions.  Spoke with niece by bedside on 4/15, 4/17, 4/20. They are taking care of pt's 3yo daughter.  Left message on cell 4/19.

## 2020-04-20 NOTE — PROGRESS NOTE ADULT - SUBJECTIVE AND OBJECTIVE BOX
[facility] Division of Hospital Medicine  Cheryl Montes MD  Pager (M-F, 8A-5P):  In-house pager [phone]; Long-range pager [phone]  Other Times:  Please page Hospitalist in Charge -  In-house pager [phone]    Patient is a 40y old  Male who presents with a chief complaint of Dyspnea (19 Apr 2020 08:35)      SUBJECTIVE / OVERNIGHT EVENTS: 97% NRB 10L, ...  ADDITIONAL REVIEW OF SYSTEMS:    MEDICATIONS  (STANDING):  ascorbic acid 500 milliGRAM(s) Oral two times a day  enoxaparin Injectable 40 milliGRAM(s) SubCutaneous two times a day  zinc sulfate 220 milliGRAM(s) Oral daily    MEDICATIONS  (PRN):  acetaminophen   Tablet .. 650 milliGRAM(s) Oral every 4 hours PRN Temp greater or equal to 38.5C (101.3F)  ALBUTerol    90 MICROgram(s) HFA Inhaler 2 Puff(s) Inhalation every 6 hours PRN Shortness of Breath and/or Wheezing  benzonatate 100 milliGRAM(s) Oral three times a day PRN Cough      CAPILLARY BLOOD GLUCOSE        I&O's Summary    19 Apr 2020 07:01  -  20 Apr 2020 07:00  --------------------------------------------------------  IN: 0 mL / OUT: 1000 mL / NET: -1000 mL        PHYSICAL EXAM:  Vital Signs Last 24 Hrs  T(C): 36.8 (19 Apr 2020 20:05), Max: 36.8 (19 Apr 2020 20:05)  T(F): 98.3 (19 Apr 2020 20:05), Max: 98.3 (19 Apr 2020 20:05)  HR: 122 (19 Apr 2020 20:05) (116 - 122)  BP: 121/66 (19 Apr 2020 20:05) (121/66 - 122/79)  BP(mean): --  RR: 20 (19 Apr 2020 20:05) (20 - 21)  SpO2: 97% (19 Apr 2020 20:05) (94% - 97%)    GENERAL: sitting up in bed, NRB, NAD  HEAD:  Atraumatic, Normocephalic  EYES: EOMI, PERRLA, injected conjunctiva b/l  NECK: Supple, No JVD  CHEST/LUNG: clear  ABDOMEN: Nondistended; Bowel sounds present  EXTREMITIES:  No clubbing, cyanosis, or edema  PSYCH: Calm  NEUROLOGY: A/Ox3, non-focal  SKIN: No rashes or lesions    LABS:                        16.8   10.22 )-----------( 327      ( 20 Apr 2020 05:55 )             50.4     04-20    135  |  97<L>  |  14  ----------------------------<  115<H>  4.4   |  25  |  0.63    Ca    9.2      20 Apr 2020 05:55  Phos  3.3     04-20  Mg     2.0     04-20    TPro  7.1  /  Alb  3.0<L>  /  TBili  0.6  /  DBili  x   /  AST  21  /  ALT  32  /  AlkPhos  85  04-20    RADIOLOGY & ADDITIONAL TESTS:  Results Reviewed:   Imaging Personally Reviewed:  Electrocardiogram Personally Reviewed:    COORDINATION OF CARE:  Care Discussed with Consultants/Other Providers [Y/N]: RN, SW, CM, ACP re overall care   Prior or Outpatient Records Reviewed [Y/N]: [facility] Division of Hospital Medicine  Cheryl Montes MD  Pager (M-F, 8A-5P):  In-house pager [phone]; Long-range pager [phone]  Other Times:  Please page Hospitalist in Charge -  In-house pager [phone]    Patient is a 40y old  Male who presents with a chief complaint of Dyspnea (19 Apr 2020 08:35)      SUBJECTIVE / OVERNIGHT EVENTS: 97% NRB 10L, .  Seen earlier with ronny Ballard at bedside.  Doing ok.  Denies chest pain, SOB.  Eating/drinking pretty well.  ADDITIONAL REVIEW OF SYSTEMS:    MEDICATIONS  (STANDING):  ascorbic acid 500 milliGRAM(s) Oral two times a day  enoxaparin Injectable 40 milliGRAM(s) SubCutaneous two times a day  zinc sulfate 220 milliGRAM(s) Oral daily    MEDICATIONS  (PRN):  acetaminophen   Tablet .. 650 milliGRAM(s) Oral every 4 hours PRN Temp greater or equal to 38.5C (101.3F)  ALBUTerol    90 MICROgram(s) HFA Inhaler 2 Puff(s) Inhalation every 6 hours PRN Shortness of Breath and/or Wheezing  benzonatate 100 milliGRAM(s) Oral three times a day PRN Cough      CAPILLARY BLOOD GLUCOSE        I&O's Summary    19 Apr 2020 07:01  -  20 Apr 2020 07:00  --------------------------------------------------------  IN: 0 mL / OUT: 1000 mL / NET: -1000 mL        PHYSICAL EXAM:  Vital Signs Last 24 Hrs  T(C): 36.8 (19 Apr 2020 20:05), Max: 36.8 (19 Apr 2020 20:05)  T(F): 98.3 (19 Apr 2020 20:05), Max: 98.3 (19 Apr 2020 20:05)  HR: 122 (19 Apr 2020 20:05) (116 - 122)  BP: 121/66 (19 Apr 2020 20:05) (121/66 - 122/79)  BP(mean): --  RR: 20 (19 Apr 2020 20:05) (20 - 21)  SpO2: 97% (19 Apr 2020 20:05) (94% - 97%)    GENERAL: sitting up in bed, NRB, NAD  HEAD:  Atraumatic, Normocephalic  EYES: EOMI, PERRLA, injected conjunctiva b/l  NECK: Supple, No JVD  CHEST/LUNG: clear  HEART: tachy  ABDOMEN: Nondistended; Bowel sounds present  EXTREMITIES:  No clubbing, cyanosis, or edema  PSYCH: Calm  NEUROLOGY: A/Ox3, non-focal  SKIN: No rashes or lesions    LABS:                        16.8   10.22 )-----------( 327      ( 20 Apr 2020 05:55 )             50.4     04-20    135  |  97<L>  |  14  ----------------------------<  115<H>  4.4   |  25  |  0.63    Ca    9.2      20 Apr 2020 05:55  Phos  3.3     04-20  Mg     2.0     04-20    TPro  7.1  /  Alb  3.0<L>  /  TBili  0.6  /  DBili  x   /  AST  21  /  ALT  32  /  AlkPhos  85  04-20    RADIOLOGY & ADDITIONAL TESTS:  Results Reviewed:   Imaging Personally Reviewed:  Electrocardiogram Personally Reviewed:    COORDINATION OF CARE:  Care Discussed with Consultants/Other Providers [Y/N]: RN, SW, CM, ACP re overall care   Prior or Outpatient Records Reviewed [Y/N]:

## 2020-04-21 LAB
ANION GAP SERPL CALC-SCNC: 11 MMO/L — SIGNIFICANT CHANGE UP (ref 7–14)
BUN SERPL-MCNC: 13 MG/DL — SIGNIFICANT CHANGE UP (ref 7–23)
CALCIUM SERPL-MCNC: 9.1 MG/DL — SIGNIFICANT CHANGE UP (ref 8.4–10.5)
CHLORIDE SERPL-SCNC: 98 MMOL/L — SIGNIFICANT CHANGE UP (ref 98–107)
CO2 SERPL-SCNC: 26 MMOL/L — SIGNIFICANT CHANGE UP (ref 22–31)
CREAT SERPL-MCNC: 0.54 MG/DL — SIGNIFICANT CHANGE UP (ref 0.5–1.3)
GLUCOSE SERPL-MCNC: 112 MG/DL — HIGH (ref 70–99)
HCT VFR BLD CALC: 49.1 % — SIGNIFICANT CHANGE UP (ref 39–50)
HGB BLD-MCNC: 16.3 G/DL — SIGNIFICANT CHANGE UP (ref 13–17)
MAGNESIUM SERPL-MCNC: 2 MG/DL — SIGNIFICANT CHANGE UP (ref 1.6–2.6)
MCHC RBC-ENTMCNC: 28.9 PG — SIGNIFICANT CHANGE UP (ref 27–34)
MCHC RBC-ENTMCNC: 33.2 % — SIGNIFICANT CHANGE UP (ref 32–36)
MCV RBC AUTO: 87.1 FL — SIGNIFICANT CHANGE UP (ref 80–100)
NRBC # FLD: 0 K/UL — SIGNIFICANT CHANGE UP (ref 0–0)
PHOSPHATE SERPL-MCNC: 3.1 MG/DL — SIGNIFICANT CHANGE UP (ref 2.5–4.5)
PLATELET # BLD AUTO: 312 K/UL — SIGNIFICANT CHANGE UP (ref 150–400)
PMV BLD: 10.4 FL — SIGNIFICANT CHANGE UP (ref 7–13)
POTASSIUM SERPL-MCNC: 4.5 MMOL/L — SIGNIFICANT CHANGE UP (ref 3.5–5.3)
POTASSIUM SERPL-SCNC: 4.5 MMOL/L — SIGNIFICANT CHANGE UP (ref 3.5–5.3)
RBC # BLD: 5.64 M/UL — SIGNIFICANT CHANGE UP (ref 4.2–5.8)
RBC # FLD: 13.1 % — SIGNIFICANT CHANGE UP (ref 10.3–14.5)
SODIUM SERPL-SCNC: 135 MMOL/L — SIGNIFICANT CHANGE UP (ref 135–145)
WBC # BLD: 11.68 K/UL — HIGH (ref 3.8–10.5)
WBC # FLD AUTO: 11.68 K/UL — HIGH (ref 3.8–10.5)

## 2020-04-21 PROCEDURE — 99233 SBSQ HOSP IP/OBS HIGH 50: CPT

## 2020-04-21 RX ADMIN — ENOXAPARIN SODIUM 90 MILLIGRAM(S): 100 INJECTION SUBCUTANEOUS at 18:15

## 2020-04-21 RX ADMIN — ZINC SULFATE TAB 220 MG (50 MG ZINC EQUIVALENT) 220 MILLIGRAM(S): 220 (50 ZN) TAB at 12:16

## 2020-04-21 RX ADMIN — ENOXAPARIN SODIUM 90 MILLIGRAM(S): 100 INJECTION SUBCUTANEOUS at 06:01

## 2020-04-21 RX ADMIN — PANTOPRAZOLE SODIUM 40 MILLIGRAM(S): 20 TABLET, DELAYED RELEASE ORAL at 06:02

## 2020-04-21 RX ADMIN — Medication 500 MILLIGRAM(S): at 06:02

## 2020-04-21 RX ADMIN — Medication 500 MILLIGRAM(S): at 18:07

## 2020-04-21 RX ADMIN — PANTOPRAZOLE SODIUM 40 MILLIGRAM(S): 20 TABLET, DELAYED RELEASE ORAL at 18:08

## 2020-04-21 NOTE — PROGRESS NOTE ADULT - SUBJECTIVE AND OBJECTIVE BOX
[facility] Division of Hospital Medicine  Cheryl Montes MD  Pager (M-F, 8A-5P):  In-house pager [phone]; Long-range pager [phone]  Other Times:  Please page Hospitalist in Charge -  In-house pager [phone]    Patient is a 40y old  Male who presents with a chief complaint of Dyspnea (20 Apr 2020 15:43)      SUBJECTIVE / OVERNIGHT EVENTS: , NRB 6L?...  ADDITIONAL REVIEW OF SYSTEMS:    MEDICATIONS  (STANDING):  ascorbic acid 500 milliGRAM(s) Oral two times a day  enoxaparin Injectable 90 milliGRAM(s) SubCutaneous every 12 hours  pantoprazole    Tablet 40 milliGRAM(s) Oral once  pantoprazole    Tablet 40 milliGRAM(s) Oral before breakfast  zinc sulfate 220 milliGRAM(s) Oral daily    MEDICATIONS  (PRN):  acetaminophen   Tablet .. 650 milliGRAM(s) Oral every 4 hours PRN Temp greater or equal to 38.5C (101.3F)  ALBUTerol    90 MICROgram(s) HFA Inhaler 2 Puff(s) Inhalation every 6 hours PRN Shortness of Breath and/or Wheezing  benzonatate 100 milliGRAM(s) Oral three times a day PRN Cough      CAPILLARY BLOOD GLUCOSE        I&O's Summary      PHYSICAL EXAM:  Vital Signs Last 24 Hrs  T(C): 37.5 (20 Apr 2020 20:28), Max: 37.5 (20 Apr 2020 20:28)  T(F): 99.5 (20 Apr 2020 20:28), Max: 99.5 (20 Apr 2020 20:28)  HR: 131 (20 Apr 2020 20:28) (115 - 131)  BP: 108/66 (20 Apr 2020 20:28) (108/66 - 116/76)  BP(mean): --  RR: 20 (21 Apr 2020 03:12) (20 - 20)  SpO2: 100% (21 Apr 2020 03:12) (95% - 100%)    GENERAL: sitting up in bed, NRB, NAD  HEAD:  Atraumatic, Normocephalic  EYES: EOMI, PERRLA, injected conjunctiva b/l  NECK: Supple, No JVD  CHEST/LUNG: clear  HEART: tachy  ABDOMEN: Nondistended; Bowel sounds present  EXTREMITIES:  No clubbing, cyanosis, or edema  PSYCH: Calm  NEUROLOGY: A/Ox3, non-focal  SKIN: No rashes or lesions    LABS:                        16.3   11.68 )-----------( 312      ( 21 Apr 2020 02:50 )             49.1     04-21    135  |  98  |  13  ----------------------------<  112<H>  4.5   |  26  |  0.54    Ca    9.1      21 Apr 2020 02:50  Phos  3.1     04-21  Mg     2.0     04-21    TPro  7.1  /  Alb  3.0<L>  /  TBili  0.6  /  DBili  x   /  AST  21  /  ALT  32  /  AlkPhos  85  04-20                RADIOLOGY & ADDITIONAL TESTS:  Results Reviewed:   Imaging Personally Reviewed:  Electrocardiogram Personally Reviewed:    COORDINATION OF CARE:  Care Discussed with Consultants/Other Providers [Y/N]: RN, SW, CM, ACP re overall care   Prior or Outpatient Records Reviewed [Y/N]: [facility] Division of Hospital Medicine  Cheryl Montes MD  Pager (M-F, 8A-5P):  In-house pager [phone]; Long-range pager [phone]  Other Times:  Please page Hospitalist in Charge -  In-house pager [phone]    Patient is a 40y old  Male who presents with a chief complaint of Dyspnea (20 Apr 2020 15:43)      SUBJECTIVE / OVERNIGHT EVENTS:  --> 110s, NC 6L.  Doing better. Breathing ok.  Eating/drinking well.  +BM.  ADDITIONAL REVIEW OF SYSTEMS:    MEDICATIONS  (STANDING):  ascorbic acid 500 milliGRAM(s) Oral two times a day  enoxaparin Injectable 90 milliGRAM(s) SubCutaneous every 12 hours  pantoprazole    Tablet 40 milliGRAM(s) Oral once  pantoprazole    Tablet 40 milliGRAM(s) Oral before breakfast  zinc sulfate 220 milliGRAM(s) Oral daily    MEDICATIONS  (PRN):  acetaminophen   Tablet .. 650 milliGRAM(s) Oral every 4 hours PRN Temp greater or equal to 38.5C (101.3F)  ALBUTerol    90 MICROgram(s) HFA Inhaler 2 Puff(s) Inhalation every 6 hours PRN Shortness of Breath and/or Wheezing  benzonatate 100 milliGRAM(s) Oral three times a day PRN Cough      CAPILLARY BLOOD GLUCOSE        I&O's Summary      PHYSICAL EXAM:  Vital Signs Last 24 Hrs  T(C): 37.5 (20 Apr 2020 20:28), Max: 37.5 (20 Apr 2020 20:28)  T(F): 99.5 (20 Apr 2020 20:28), Max: 99.5 (20 Apr 2020 20:28)  HR: 131 (20 Apr 2020 20:28) (115 - 131)  BP: 108/66 (20 Apr 2020 20:28) (108/66 - 116/76)  BP(mean): --  RR: 20 (21 Apr 2020 03:12) (20 - 20)  SpO2: 100% (21 Apr 2020 03:12) (95% - 100%)    GENERAL: sitting up in bed, NRB, NAD  HEAD:  Atraumatic, Normocephalic  EYES: EOMI, PERRLA  NECK: Supple, No JVD  CHEST/LUNG: clear  HEART: tachy  ABDOMEN: Nondistended; Bowel sounds present  EXTREMITIES:  No clubbing, cyanosis, or edema  PSYCH: Calm  NEUROLOGY: A/Ox3, non-focal  SKIN: No rashes or lesions    LABS:                        16.3   11.68 )-----------( 312      ( 21 Apr 2020 02:50 )             49.1     04-21    135  |  98  |  13  ----------------------------<  112<H>  4.5   |  26  |  0.54    Ca    9.1      21 Apr 2020 02:50  Phos  3.1     04-21  Mg     2.0     04-21    TPro  7.1  /  Alb  3.0<L>  /  TBili  0.6  /  DBili  x   /  AST  21  /  ALT  32  /  AlkPhos  85  04-20                RADIOLOGY & ADDITIONAL TESTS:  Results Reviewed:   Imaging Personally Reviewed:  Electrocardiogram Personally Reviewed:    COORDINATION OF CARE:  Care Discussed with Consultants/Other Providers [Y/N]: RN, SW, CM, ACP re overall care   Prior or Outpatient Records Reviewed [Y/N]:

## 2020-04-21 NOTE — PROGRESS NOTE ADULT - ATTENDING COMMENTS
d/w ronny Fajardo [phone] 4/9, 4/10, 4/11, 4/12, 4/13, 4/14, 4/16, 4/18 - answered all the questions.  Spoke with niece by bedside on 4/15, 4/17, 4/20. They are taking care of pt's 5yo daughter.  Left message on cell 4/19. d/w niece Nellie Fajardo [phone] 4/9, 4/10, 4/11, 4/12, 4/13, 4/14, 4/16, 4/18, 4/21 - answered all the questions.  Spoke with niece by bedside on 4/15, 4/17, 4/20. They are taking care of pt's 3yo daughter.  Left message on cell 4/19.

## 2020-04-21 NOTE — PROGRESS NOTE ADULT - PROBLEM SELECTOR PLAN 1
2/2 COVID 19 viral infection/pneumonia  - s/p Plaquenil, steroids, proning, chest PT/IS/albuterol, titrate O2 as able  - completed Anakinra - Ddimer  623 --> 246, ferritin 2391 --> 1107 both decreasing, CRP up though;    - has been Lovenox 40 bid SC for VTE prophylaxis, given persistent tachycardia, even though Ddimer not significantly elevate will start full dose a/c - d/w pt and niece - he has not h/o bleeding or clotting.  - check T&S, being evaluated for possible plasma study  - full code 2/2 COVID 19 viral infection/pneumonia  - s/p Plaquenil, steroids, proning, chest PT/IS/albuterol, titrate O2 as able  - completed Anakinra - Ddimer  623 --> 246, ferritin 2391 --> 1107 both decreasing, CRP up though;    - has been Lovenox 40 bid SC for VTE prophylaxis, given persistent tachycardia, even though Ddimer not significantly elevate will start full dose a/c - d/w pt and niece - he has not h/o bleeding or clotting --> seemed able to titrate down O2 since...eventual CTA  - check T&S, being evaluated for possible plasma study  - full code

## 2020-04-21 NOTE — CHART NOTE - NSCHARTNOTEFT_GEN_A_CORE
Patient with Biotel MCOT.   Report reviewed.   Normal sinus rhythm with a PVC.   QTC 430ms.  No atrial fibrillation or ventricular tachycardia recorded.   Completed course of hydroxychloroquine.  Plan to remove Biotel MCOT in the morning.

## 2020-04-22 LAB
ALBUMIN SERPL ELPH-MCNC: 2.8 G/DL — LOW (ref 3.3–5)
ALP SERPL-CCNC: 81 U/L — SIGNIFICANT CHANGE UP (ref 40–120)
ALT FLD-CCNC: 33 U/L — SIGNIFICANT CHANGE UP (ref 4–41)
ANION GAP SERPL CALC-SCNC: 11 MMO/L — SIGNIFICANT CHANGE UP (ref 7–14)
AST SERPL-CCNC: 17 U/L — SIGNIFICANT CHANGE UP (ref 4–40)
BILIRUB SERPL-MCNC: 0.3 MG/DL — SIGNIFICANT CHANGE UP (ref 0.2–1.2)
BUN SERPL-MCNC: 14 MG/DL — SIGNIFICANT CHANGE UP (ref 7–23)
CALCIUM SERPL-MCNC: 9.2 MG/DL — SIGNIFICANT CHANGE UP (ref 8.4–10.5)
CHLORIDE SERPL-SCNC: 101 MMOL/L — SIGNIFICANT CHANGE UP (ref 98–107)
CO2 SERPL-SCNC: 25 MMOL/L — SIGNIFICANT CHANGE UP (ref 22–31)
CREAT SERPL-MCNC: 0.69 MG/DL — SIGNIFICANT CHANGE UP (ref 0.5–1.3)
CRP SERPL-MCNC: 27.8 MG/L — HIGH
FERRITIN SERPL-MCNC: 705.1 NG/ML — HIGH (ref 30–400)
GLUCOSE SERPL-MCNC: 120 MG/DL — HIGH (ref 70–99)
HCT VFR BLD CALC: 48 % — SIGNIFICANT CHANGE UP (ref 39–50)
HGB BLD-MCNC: 15.4 G/DL — SIGNIFICANT CHANGE UP (ref 13–17)
LDH SERPL L TO P-CCNC: 288 U/L — HIGH (ref 135–225)
MAGNESIUM SERPL-MCNC: 2.1 MG/DL — SIGNIFICANT CHANGE UP (ref 1.6–2.6)
MCHC RBC-ENTMCNC: 28.2 PG — SIGNIFICANT CHANGE UP (ref 27–34)
MCHC RBC-ENTMCNC: 32.1 % — SIGNIFICANT CHANGE UP (ref 32–36)
MCV RBC AUTO: 87.9 FL — SIGNIFICANT CHANGE UP (ref 80–100)
NRBC # FLD: 0 K/UL — SIGNIFICANT CHANGE UP (ref 0–0)
PHOSPHATE SERPL-MCNC: 3.1 MG/DL — SIGNIFICANT CHANGE UP (ref 2.5–4.5)
PLATELET # BLD AUTO: 251 K/UL — SIGNIFICANT CHANGE UP (ref 150–400)
PMV BLD: 10.6 FL — SIGNIFICANT CHANGE UP (ref 7–13)
POTASSIUM SERPL-MCNC: 4.6 MMOL/L — SIGNIFICANT CHANGE UP (ref 3.5–5.3)
POTASSIUM SERPL-SCNC: 4.6 MMOL/L — SIGNIFICANT CHANGE UP (ref 3.5–5.3)
PROT SERPL-MCNC: 6.9 G/DL — SIGNIFICANT CHANGE UP (ref 6–8.3)
RBC # BLD: 5.46 M/UL — SIGNIFICANT CHANGE UP (ref 4.2–5.8)
RBC # FLD: 13.2 % — SIGNIFICANT CHANGE UP (ref 10.3–14.5)
SODIUM SERPL-SCNC: 137 MMOL/L — SIGNIFICANT CHANGE UP (ref 135–145)
WBC # BLD: 10.01 K/UL — SIGNIFICANT CHANGE UP (ref 3.8–10.5)
WBC # FLD AUTO: 10.01 K/UL — SIGNIFICANT CHANGE UP (ref 3.8–10.5)

## 2020-04-22 PROCEDURE — 99232 SBSQ HOSP IP/OBS MODERATE 35: CPT

## 2020-04-22 PROCEDURE — 99233 SBSQ HOSP IP/OBS HIGH 50: CPT

## 2020-04-22 RX ADMIN — Medication 500 MILLIGRAM(S): at 06:07

## 2020-04-22 RX ADMIN — ENOXAPARIN SODIUM 90 MILLIGRAM(S): 100 INJECTION SUBCUTANEOUS at 06:07

## 2020-04-22 RX ADMIN — ENOXAPARIN SODIUM 90 MILLIGRAM(S): 100 INJECTION SUBCUTANEOUS at 17:58

## 2020-04-22 RX ADMIN — PANTOPRAZOLE SODIUM 40 MILLIGRAM(S): 20 TABLET, DELAYED RELEASE ORAL at 06:07

## 2020-04-22 RX ADMIN — Medication 500 MILLIGRAM(S): at 17:57

## 2020-04-22 RX ADMIN — ZINC SULFATE TAB 220 MG (50 MG ZINC EQUIVALENT) 220 MILLIGRAM(S): 220 (50 ZN) TAB at 14:56

## 2020-04-22 NOTE — PROGRESS NOTE ADULT - SUBJECTIVE AND OBJECTIVE BOX
Follow Up:      Inverval History/ROS:Patient is a 40y old  Male who presents with a chief complaint of Dyspnea (22 Apr 2020 14:19)    No fever  On nasal canula.  Feels a little better.    Allergies    No Known Allergies    Intolerances        ANTIMICROBIALS:      OTHER MEDS:  acetaminophen   Tablet .. 650 milliGRAM(s) Oral every 4 hours PRN  ALBUTerol    90 MICROgram(s) HFA Inhaler 2 Puff(s) Inhalation every 6 hours PRN  ascorbic acid 500 milliGRAM(s) Oral two times a day  benzonatate 100 milliGRAM(s) Oral three times a day PRN  enoxaparin Injectable 90 milliGRAM(s) SubCutaneous every 12 hours  pantoprazole    Tablet 40 milliGRAM(s) Oral before breakfast  zinc sulfate 220 milliGRAM(s) Oral daily      Vital Signs Last 24 Hrs  T(C): 36.8 (22 Apr 2020 14:56), Max: 37 (21 Apr 2020 21:53)  T(F): 98.3 (22 Apr 2020 14:56), Max: 98.6 (21 Apr 2020 21:53)  HR: 123 (22 Apr 2020 14:56) (119 - 123)  BP: 119/68 (22 Apr 2020 14:56) (119/67 - 119/68)  BP(mean): --  RR: 16 (22 Apr 2020 14:56) (16 - 19)  SpO2: 92% (22 Apr 2020 14:56) (92% - 94%)    PHYSICAL EXAM:  General: [ ] non-toxic  HEAD/EYES: [ ] PERRL [x ] white sclera [ ] icterus  ENT:  [ ] normal [ x] supple [ ] thrush [ ] pharyngeal exudate  Cardiovascular:   [ ] murmur [ x] normal [ ] PPM/AICD  Respiratory:  [x ] clear to ausculation bilaterally  GI:  [x ] soft, non-tender, normal bowel sounds  :  [ ] mansfield x[ ] no CVA tenderness   Musculoskeletal:  [ ] no synovitis  Neurologic:  [ ] non-focal exam   Skin:  xx[ ] no rash  Lymph: [ ] no lymphadenopathy  Psychiatric:  [ ] appropriate affect [ ] alert & oriented  Lines:  [ x] no phlebitis [ ] central line                                15.4   10.01 )-----------( 251      ( 22 Apr 2020 05:00 )             48.0       04-22    137  |  101  |  14  ----------------------------<  120<H>  4.6   |  25  |  0.69    Ca    9.2      22 Apr 2020 05:00  Phos  3.1     04-22  Mg     2.1     04-22    TPro  6.9  /  Alb  2.8<L>  /  TBili  0.3  /  DBili  x   /  AST  17  /  ALT  33  /  AlkPhos  81  04-22          MICROBIOLOGY:    RADIOLOGY:

## 2020-04-22 NOTE — PROGRESS NOTE ADULT - SUBJECTIVE AND OBJECTIVE BOX
Medicine Progress Note    Patient is a 40y old  Male who presents with a chief complaint of Dyspnea (21 Apr 2020 08:43)      SUBJECTIVE / OVERNIGHT EVENTS: no acute overnight events, no current concerns, remains on 6L NC    ADDITIONAL REVIEW OF SYSTEMS:    MEDICATIONS  (STANDING):  ascorbic acid 500 milliGRAM(s) Oral two times a day  enoxaparin Injectable 90 milliGRAM(s) SubCutaneous every 12 hours  pantoprazole    Tablet 40 milliGRAM(s) Oral before breakfast  zinc sulfate 220 milliGRAM(s) Oral daily    MEDICATIONS  (PRN):  acetaminophen   Tablet .. 650 milliGRAM(s) Oral every 4 hours PRN Temp greater or equal to 38.5C (101.3F)  ALBUTerol    90 MICROgram(s) HFA Inhaler 2 Puff(s) Inhalation every 6 hours PRN Shortness of Breath and/or Wheezing  benzonatate 100 milliGRAM(s) Oral three times a day PRN Cough    CAPILLARY BLOOD GLUCOSE        I&O's Summary      PHYSICAL EXAM:  Vital Signs Last 24 Hrs  T(C): 37 (21 Apr 2020 21:53), Max: 37 (21 Apr 2020 21:53)  T(F): 98.6 (21 Apr 2020 21:53), Max: 98.6 (21 Apr 2020 21:53)  HR: 119 (21 Apr 2020 21:53) (119 - 120)  BP: 119/67 (21 Apr 2020 21:53) (116/81 - 119/67)  BP(mean): --  RR: 19 (21 Apr 2020 21:53) (18 - 19)  SpO2: 94% (21 Apr 2020 21:53) (92% - 94%)  CONSTITUTIONAL: NAD, well-developed, well-groomed  ENMT: Moist oral mucosa, no pharyngeal injection or exudates; normal dentition  RESPIRATORY: Normal respiratory effort; no accessory muscle use, speaking in full sentences, b/l crackles   PSYCH: A+O to person, place, and time; affect appropriate   SKIN: No rashes; no palpable lesions  NO CCE    LABS:                        15.4   10.01 )-----------( 251      ( 22 Apr 2020 05:00 )             48.0     04-22    137  |  101  |  14  ----------------------------<  120<H>  4.6   |  25  |  0.69    Ca    9.2      22 Apr 2020 05:00  Phos  3.1     04-22  Mg     2.1     04-22    TPro  6.9  /  Alb  2.8<L>  /  TBili  0.3  /  DBili  x   /  AST  17  /  ALT  33  /  AlkPhos  81  04-22              COVID-19 PCR: Detected (09 Apr 2020 02:49)      RADIOLOGY & ADDITIONAL TESTS:  Imaging from Last 24 Hours:    Electrocardiogram/QTc Interval:    COORDINATION OF CARE:  Care Discussed with Consultants/Other Providers:

## 2020-04-22 NOTE — PROGRESS NOTE ADULT - ASSESSMENT
40 year old with COVID multifocal pneumonia  complicated by hypoxia.    S/p Plaquenil    S/p full dose anticoagulation- empirically for PE    Prior course steroid anakinra    Seems a little better today.

## 2020-04-22 NOTE — PROGRESS NOTE ADULT - PROBLEM SELECTOR PLAN 1
2/2 COVID 19 viral infection/pneumonia  - s/p Plaquenil, steroids, proning, chest PT/IS/albuterol, titrate O2 as able  - completed Anakinra - Ddimer  623 --> 241, ferritin 2391 --> 705 both are decreasing  - continue therapeutic AC 90 bid lovenox SC  -  being evaluated for possible plasma study  - full code  -discussed with patients niece Nellie

## 2020-04-23 LAB
ALBUMIN SERPL ELPH-MCNC: 3 G/DL — LOW (ref 3.3–5)
ALP SERPL-CCNC: 71 U/L — SIGNIFICANT CHANGE UP (ref 40–120)
ALT FLD-CCNC: 32 U/L — SIGNIFICANT CHANGE UP (ref 4–41)
ANION GAP SERPL CALC-SCNC: 11 MMO/L — SIGNIFICANT CHANGE UP (ref 7–14)
AST SERPL-CCNC: 19 U/L — SIGNIFICANT CHANGE UP (ref 4–40)
BILIRUB SERPL-MCNC: 0.4 MG/DL — SIGNIFICANT CHANGE UP (ref 0.2–1.2)
BUN SERPL-MCNC: 14 MG/DL — SIGNIFICANT CHANGE UP (ref 7–23)
CALCIUM SERPL-MCNC: 9.2 MG/DL — SIGNIFICANT CHANGE UP (ref 8.4–10.5)
CHLORIDE SERPL-SCNC: 100 MMOL/L — SIGNIFICANT CHANGE UP (ref 98–107)
CO2 SERPL-SCNC: 27 MMOL/L — SIGNIFICANT CHANGE UP (ref 22–31)
CREAT SERPL-MCNC: 0.73 MG/DL — SIGNIFICANT CHANGE UP (ref 0.5–1.3)
GLUCOSE SERPL-MCNC: 88 MG/DL — SIGNIFICANT CHANGE UP (ref 70–99)
HCT VFR BLD CALC: 47.2 % — SIGNIFICANT CHANGE UP (ref 39–50)
HGB BLD-MCNC: 14.9 G/DL — SIGNIFICANT CHANGE UP (ref 13–17)
MAGNESIUM SERPL-MCNC: 2 MG/DL — SIGNIFICANT CHANGE UP (ref 1.6–2.6)
MCHC RBC-ENTMCNC: 28.2 PG — SIGNIFICANT CHANGE UP (ref 27–34)
MCHC RBC-ENTMCNC: 31.6 % — LOW (ref 32–36)
MCV RBC AUTO: 89.4 FL — SIGNIFICANT CHANGE UP (ref 80–100)
NRBC # FLD: 0 K/UL — SIGNIFICANT CHANGE UP (ref 0–0)
PHOSPHATE SERPL-MCNC: 3.2 MG/DL — SIGNIFICANT CHANGE UP (ref 2.5–4.5)
PLATELET # BLD AUTO: 219 K/UL — SIGNIFICANT CHANGE UP (ref 150–400)
PMV BLD: 10.9 FL — SIGNIFICANT CHANGE UP (ref 7–13)
POTASSIUM SERPL-MCNC: 4.6 MMOL/L — SIGNIFICANT CHANGE UP (ref 3.5–5.3)
POTASSIUM SERPL-SCNC: 4.6 MMOL/L — SIGNIFICANT CHANGE UP (ref 3.5–5.3)
PROT SERPL-MCNC: 6.8 G/DL — SIGNIFICANT CHANGE UP (ref 6–8.3)
RBC # BLD: 5.28 M/UL — SIGNIFICANT CHANGE UP (ref 4.2–5.8)
RBC # FLD: 13.2 % — SIGNIFICANT CHANGE UP (ref 10.3–14.5)
SODIUM SERPL-SCNC: 138 MMOL/L — SIGNIFICANT CHANGE UP (ref 135–145)
WBC # BLD: 7.36 K/UL — SIGNIFICANT CHANGE UP (ref 3.8–10.5)
WBC # FLD AUTO: 7.36 K/UL — SIGNIFICANT CHANGE UP (ref 3.8–10.5)

## 2020-04-23 PROCEDURE — 99232 SBSQ HOSP IP/OBS MODERATE 35: CPT

## 2020-04-23 PROCEDURE — 99233 SBSQ HOSP IP/OBS HIGH 50: CPT

## 2020-04-23 RX ADMIN — ZINC SULFATE TAB 220 MG (50 MG ZINC EQUIVALENT) 220 MILLIGRAM(S): 220 (50 ZN) TAB at 17:06

## 2020-04-23 RX ADMIN — Medication 500 MILLIGRAM(S): at 05:03

## 2020-04-23 RX ADMIN — ENOXAPARIN SODIUM 90 MILLIGRAM(S): 100 INJECTION SUBCUTANEOUS at 17:07

## 2020-04-23 RX ADMIN — PANTOPRAZOLE SODIUM 40 MILLIGRAM(S): 20 TABLET, DELAYED RELEASE ORAL at 05:03

## 2020-04-23 RX ADMIN — Medication 500 MILLIGRAM(S): at 17:06

## 2020-04-23 RX ADMIN — ENOXAPARIN SODIUM 90 MILLIGRAM(S): 100 INJECTION SUBCUTANEOUS at 05:04

## 2020-04-23 NOTE — PROGRESS NOTE ADULT - SUBJECTIVE AND OBJECTIVE BOX
Follow Up:      Inverval History/ROS:Patient is a 40y old  Male who presents with a chief complaint of Dyspnea (23 Apr 2020 13:48)    On nasal canula.  Feels less sob.  Feels better than yesterday.       Allergies    No Known Allergies    Intolerances        ANTIMICROBIALS:      OTHER MEDS:  acetaminophen   Tablet .. 650 milliGRAM(s) Oral every 4 hours PRN  ALBUTerol    90 MICROgram(s) HFA Inhaler 2 Puff(s) Inhalation every 6 hours PRN  ascorbic acid 500 milliGRAM(s) Oral two times a day  benzonatate 100 milliGRAM(s) Oral three times a day PRN  enoxaparin Injectable 90 milliGRAM(s) SubCutaneous every 12 hours  pantoprazole    Tablet 40 milliGRAM(s) Oral before breakfast  zinc sulfate 220 milliGRAM(s) Oral daily      Vital Signs Last 24 Hrs  T(C): 36.6 (22 Apr 2020 20:58), Max: 36.8 (22 Apr 2020 14:56)  T(F): 97.9 (22 Apr 2020 20:58), Max: 98.3 (22 Apr 2020 14:56)  HR: 112 (22 Apr 2020 20:58) (112 - 123)  BP: 122/70 (22 Apr 2020 20:58) (119/68 - 122/70)  BP(mean): --  RR: 18 (22 Apr 2020 20:58) (16 - 18)  SpO2: 91% (22 Apr 2020 20:58) (91% - 92%)    PHYSICAL EXAM:  General: [x ] non-toxic  HEAD/EYES: [ ] PERRL [x ] white sclera [ ] icterus  ENT:  [ ] normal [ ] supple [ ] thrush [ ] pharyngeal exudate  Cardiovascular:   [ ] murmur [x ] normal [ ] PPM/AICD  Respiratory:  [x ] clear to ausculation bilaterally  GI:  [x ] soft, non-tender, normal bowel sounds  :  [ ] mansfield [ ] no CVA tenderness   Musculoskeletal:  [ ] no synovitis  Neurologic:  [ ] non-focal exam   Skin:  [x ] no rash  Lymph: [ ] no lymphadenopathy  Psychiatric:  [ ] appropriate affect [ ] alert & oriented  Lines:  [ x] no phlebitis [ ] central line                                14.9   7.36  )-----------( 219      ( 23 Apr 2020 05:45 )             47.2       04-23    138  |  100  |  14  ----------------------------<  88  4.6   |  27  |  0.73    Ca    9.2      23 Apr 2020 05:45  Phos  3.2     04-23  Mg     2.0     04-23    TPro  6.8  /  Alb  3.0<L>  /  TBili  0.4  /  DBili  x   /  AST  19  /  ALT  32  /  AlkPhos  71  04-23          MICROBIOLOGY:    RADIOLOGY:

## 2020-04-23 NOTE — PROGRESS NOTE ADULT - ASSESSMENT
40 year old with COVID multifocal pneumonia  complicated by hypoxia.    S/p Plaquenil    S/p full dose anticoagulation- empirically for PE    Prior course steroid anakinra    Slowly improving

## 2020-04-23 NOTE — PROGRESS NOTE ADULT - PROBLEM SELECTOR PLAN 1
2/2 COVID 19 viral infection/pneumonia  - s/p Plaquenil, steroids, proning, chest PT/IS/albuterol, titrate O2 as able  - completed Anakinra   - continue therapeutic AC 90 bid lovenox SC  -  pt and family not interested in being enrolled in plasma study  - full code  -discussed with patients niece Nellie

## 2020-04-24 LAB
ALBUMIN SERPL ELPH-MCNC: 2.8 G/DL — LOW (ref 3.3–5)
ALP SERPL-CCNC: 67 U/L — SIGNIFICANT CHANGE UP (ref 40–120)
ALT FLD-CCNC: 33 U/L — SIGNIFICANT CHANGE UP (ref 4–41)
ANION GAP SERPL CALC-SCNC: 9 MMO/L — SIGNIFICANT CHANGE UP (ref 7–14)
AST SERPL-CCNC: 19 U/L — SIGNIFICANT CHANGE UP (ref 4–40)
BILIRUB SERPL-MCNC: 0.3 MG/DL — SIGNIFICANT CHANGE UP (ref 0.2–1.2)
BUN SERPL-MCNC: 13 MG/DL — SIGNIFICANT CHANGE UP (ref 7–23)
CALCIUM SERPL-MCNC: 9 MG/DL — SIGNIFICANT CHANGE UP (ref 8.4–10.5)
CHLORIDE SERPL-SCNC: 100 MMOL/L — SIGNIFICANT CHANGE UP (ref 98–107)
CO2 SERPL-SCNC: 26 MMOL/L — SIGNIFICANT CHANGE UP (ref 22–31)
CREAT SERPL-MCNC: 0.53 MG/DL — SIGNIFICANT CHANGE UP (ref 0.5–1.3)
CRP SERPL-MCNC: 9.5 MG/L — HIGH
FERRITIN SERPL-MCNC: 558.6 NG/ML — HIGH (ref 30–400)
GLUCOSE SERPL-MCNC: 100 MG/DL — HIGH (ref 70–99)
HCT VFR BLD CALC: 44.8 % — SIGNIFICANT CHANGE UP (ref 39–50)
HGB BLD-MCNC: 14.6 G/DL — SIGNIFICANT CHANGE UP (ref 13–17)
LDH SERPL L TO P-CCNC: 248 U/L — HIGH (ref 135–225)
MAGNESIUM SERPL-MCNC: 2 MG/DL — SIGNIFICANT CHANGE UP (ref 1.6–2.6)
MCHC RBC-ENTMCNC: 28.9 PG — SIGNIFICANT CHANGE UP (ref 27–34)
MCHC RBC-ENTMCNC: 32.6 % — SIGNIFICANT CHANGE UP (ref 32–36)
MCV RBC AUTO: 88.7 FL — SIGNIFICANT CHANGE UP (ref 80–100)
NRBC # FLD: 0 K/UL — SIGNIFICANT CHANGE UP (ref 0–0)
PHOSPHATE SERPL-MCNC: 3.2 MG/DL — SIGNIFICANT CHANGE UP (ref 2.5–4.5)
PLATELET # BLD AUTO: 200 K/UL — SIGNIFICANT CHANGE UP (ref 150–400)
PMV BLD: 11.4 FL — SIGNIFICANT CHANGE UP (ref 7–13)
POTASSIUM SERPL-MCNC: 4.2 MMOL/L — SIGNIFICANT CHANGE UP (ref 3.5–5.3)
POTASSIUM SERPL-SCNC: 4.2 MMOL/L — SIGNIFICANT CHANGE UP (ref 3.5–5.3)
PROT SERPL-MCNC: 6.7 G/DL — SIGNIFICANT CHANGE UP (ref 6–8.3)
RBC # BLD: 5.05 M/UL — SIGNIFICANT CHANGE UP (ref 4.2–5.8)
RBC # FLD: 13.2 % — SIGNIFICANT CHANGE UP (ref 10.3–14.5)
SODIUM SERPL-SCNC: 135 MMOL/L — SIGNIFICANT CHANGE UP (ref 135–145)
WBC # BLD: 6.55 K/UL — SIGNIFICANT CHANGE UP (ref 3.8–10.5)
WBC # FLD AUTO: 6.55 K/UL — SIGNIFICANT CHANGE UP (ref 3.8–10.5)

## 2020-04-24 PROCEDURE — 99232 SBSQ HOSP IP/OBS MODERATE 35: CPT

## 2020-04-24 PROCEDURE — 99233 SBSQ HOSP IP/OBS HIGH 50: CPT

## 2020-04-24 RX ORDER — SODIUM CHLORIDE 0.65 %
1 AEROSOL, SPRAY (ML) NASAL
Refills: 0 | Status: DISCONTINUED | OUTPATIENT
Start: 2020-04-24 | End: 2020-04-29

## 2020-04-24 RX ADMIN — Medication 1 SPRAY(S): at 11:28

## 2020-04-24 RX ADMIN — ENOXAPARIN SODIUM 90 MILLIGRAM(S): 100 INJECTION SUBCUTANEOUS at 17:57

## 2020-04-24 RX ADMIN — ENOXAPARIN SODIUM 90 MILLIGRAM(S): 100 INJECTION SUBCUTANEOUS at 05:31

## 2020-04-24 RX ADMIN — ZINC SULFATE TAB 220 MG (50 MG ZINC EQUIVALENT) 220 MILLIGRAM(S): 220 (50 ZN) TAB at 11:28

## 2020-04-24 RX ADMIN — Medication 500 MILLIGRAM(S): at 05:31

## 2020-04-24 RX ADMIN — PANTOPRAZOLE SODIUM 40 MILLIGRAM(S): 20 TABLET, DELAYED RELEASE ORAL at 05:31

## 2020-04-24 RX ADMIN — Medication 500 MILLIGRAM(S): at 17:54

## 2020-04-24 NOTE — DISCHARGE NOTE PROVIDER - HOSPITAL COURSE
Pt was admitted to the hospital and was found to have COVID 19. Pt was treated with plaquenil, steroids, anakinra. Pt not interested in plasma trial. Pt was also managed supportively with oxygen supplementation. Full lovenox was started for elevated d-dimer while inpatient.    Pt was weaned off supplemental oxygen and was saturating well on room air prior to discharge.         Pt medically stable for discharge on ------, as per discussion with -----. Pt was admitted to the hospital and was found to have COVID 19. Pt was treated with plaquenil, steroids, anakinra. Pt not interested in plasma trial. Pt was also managed supportively with oxygen supplementation saturating well on 3L NC 95%. Full lovenox was started for elevated d-dimer while inpatient, however, CTA negative for PE, so will discharge on 30 days PPX dosing. Patient to be sent home w/ portable O2 tank as desaturates to 82% w/ ambulation.        Dispo - Home

## 2020-04-24 NOTE — DISCHARGE NOTE PROVIDER - NSDCMRMEDTOKEN_GEN_ALL_CORE_FT
acetaminophen 325 mg oral tablet: 2 tab(s) orally every 6 hours, As Needed  albuterol 90 mcg/inh inhalation aerosol: 2 puff(s) inhaled every 6 hours, As needed, Shortness of Breath and/or Wheezing  ascorbic acid 500 mg oral tablet: 1 tab(s) orally once a day  pantoprazole 40 mg oral delayed release tablet: 1 tab(s) orally once a day (before a meal)  Xarelto 10 mg oral tablet: 1 tab(s) orally once a day acetaminophen 325 mg oral tablet: 2 tab(s) orally every 6 hours, As Needed  albuterol 90 mcg/inh inhalation aerosol: 2 puff(s) inhaled every 6 hours, As needed, Shortness of Breath and/or Wheezing  ascorbic acid 500 mg oral tablet: 1 tab(s) orally once a day  Pepcid 20 mg oral tablet: 1 tab(s) orally 2 times a day  Xarelto 10 mg oral tablet: 1 tab(s) orally once a day

## 2020-04-24 NOTE — PROGRESS NOTE ADULT - ASSESSMENT
40 year old with COVID multifocal pneumonia  complicated by hypoxia.    S/p Plaquenil    S/p full dose anticoagulation- empirically for PE    Prior course steroid anakinra    Slowly improving  Supportive care    Call ID service with questions

## 2020-04-24 NOTE — PROGRESS NOTE ADULT - SUBJECTIVE AND OBJECTIVE BOX
Medicine Progress Note    Patient is a 40y old  Male who presents with a chief complaint of Dyspnea (23 Apr 2020 14:42)      SUBJECTIVE / OVERNIGHT EVENTS: no acute events overnight    ADDITIONAL REVIEW OF SYSTEMS:    MEDICATIONS  (STANDING):  ascorbic acid 500 milliGRAM(s) Oral two times a day  enoxaparin Injectable 90 milliGRAM(s) SubCutaneous every 12 hours  pantoprazole    Tablet 40 milliGRAM(s) Oral before breakfast  zinc sulfate 220 milliGRAM(s) Oral daily    MEDICATIONS  (PRN):  acetaminophen   Tablet .. 650 milliGRAM(s) Oral every 4 hours PRN Temp greater or equal to 38.5C (101.3F)  ALBUTerol    90 MICROgram(s) HFA Inhaler 2 Puff(s) Inhalation every 6 hours PRN Shortness of Breath and/or Wheezing  benzonatate 100 milliGRAM(s) Oral three times a day PRN Cough  sodium chloride 0.65% Nasal 1 Spray(s) Both Nostrils four times a day PRN Nasal Congestion    CAPILLARY BLOOD GLUCOSE        I&O's Summary      PHYSICAL EXAM:  Vital Signs Last 24 Hrs  T(C): 36.4 (24 Apr 2020 11:29), Max: 36.8 (23 Apr 2020 18:10)  T(F): 97.5 (24 Apr 2020 11:29), Max: 98.3 (23 Apr 2020 18:10)  HR: 102 (24 Apr 2020 11:29) (102 - 116)  BP: 117/82 (24 Apr 2020 11:29) (110/63 - 117/82)  BP(mean): --  RR: 20 (24 Apr 2020 11:29) (17 - 20)  SpO2: 92% (24 Apr 2020 11:29) (90% - 93%)  CONSTITUTIONAL: NAD, well-developed, well-groomed  RESPIRATORY: No accessory muscle use  PSYCH: A+O to person, place, and time; affect appropriate  SKIN: No rashes; no palpable lesions    LABS:                        14.6   6.55  )-----------( 200      ( 24 Apr 2020 06:30 )             44.8     04-24    135  |  100  |  13  ----------------------------<  100<H>  4.2   |  26  |  0.53    Ca    9.0      24 Apr 2020 06:30  Phos  3.2     04-24  Mg     2.0     04-24    TPro  6.7  /  Alb  2.8<L>  /  TBili  0.3  /  DBili  x   /  AST  19  /  ALT  33  /  AlkPhos  67  04-24              COVID-19 PCR: Detected (09 Apr 2020 02:49)      RADIOLOGY & ADDITIONAL TESTS:  Imaging from Last 24 Hours:    Electrocardiogram/QTc Interval:    COORDINATION OF CARE:  Care Discussed with Consultants/Other Providers:

## 2020-04-24 NOTE — PROGRESS NOTE ADULT - SUBJECTIVE AND OBJECTIVE BOX
Follow Up:      Inverval History/ROS:Patient is a 40y old  Male who presents with a chief complaint of Dyspnea (24 Apr 2020 11:35)    Feels better today.  C/o nose feeling dry    Allergies    No Known Allergies    Intolerances        ANTIMICROBIALS:      OTHER MEDS:  acetaminophen   Tablet .. 650 milliGRAM(s) Oral every 4 hours PRN  ALBUTerol    90 MICROgram(s) HFA Inhaler 2 Puff(s) Inhalation every 6 hours PRN  ascorbic acid 500 milliGRAM(s) Oral two times a day  benzonatate 100 milliGRAM(s) Oral three times a day PRN  enoxaparin Injectable 90 milliGRAM(s) SubCutaneous every 12 hours  pantoprazole    Tablet 40 milliGRAM(s) Oral before breakfast  sodium chloride 0.65% Nasal 1 Spray(s) Both Nostrils four times a day PRN  zinc sulfate 220 milliGRAM(s) Oral daily      Vital Signs Last 24 Hrs  T(C): 36.4 (24 Apr 2020 11:29), Max: 36.8 (23 Apr 2020 18:10)  T(F): 97.5 (24 Apr 2020 11:29), Max: 98.3 (23 Apr 2020 18:10)  HR: 102 (24 Apr 2020 11:29) (102 - 116)  BP: 117/82 (24 Apr 2020 11:29) (110/63 - 117/82)  BP(mean): --  RR: 20 (24 Apr 2020 11:29) (17 - 20)  SpO2: 92% (24 Apr 2020 11:29) (90% - 93%)    PHYSICAL EXAM:  General: [x ] NC  HEAD/EYES: [ ] PERRL [ x] white sclera [ ] icterus  ENT:  [ ] normal [ ]x supple [ ] thrush [ ] pharyngeal exudate  Cardiovascular:   [ ] murmur [x ] normal [ ] PPM/AICD  Respiratory:  [ ] clear to ausculation bilaterally  GI:  [x ] soft, non-tender, normal bowel sounds  :  [ ] mansfield [ ] no CVA tenderness   Musculoskeletal:  [ ] no synovitis  Neurologic:  [ ] non-focal exam   Skin:  [x ] no rash  Lymph: [ ] no lymphadenopathy  Psychiatric:  [ ] appropriate affect [ ] alert & oriented  Lines:  [ ] no phlebitis [ ] central line                                14.6   6.55  )-----------( 200      ( 24 Apr 2020 06:30 )             44.8       04-24    135  |  100  |  13  ----------------------------<  100<H>  4.2   |  26  |  0.53    Ca    9.0      24 Apr 2020 06:30  Phos  3.2     04-24  Mg     2.0     04-24    TPro  6.7  /  Alb  2.8<L>  /  TBili  0.3  /  DBili  x   /  AST  19  /  ALT  33  /  AlkPhos  67  04-24          MICROBIOLOGY:    RADIOLOGY:

## 2020-04-24 NOTE — DISCHARGE NOTE PROVIDER - NSDCCPCAREPLAN_GEN_ALL_CORE_FT
PRINCIPAL DISCHARGE DIAGNOSIS  Diagnosis: Infection due to 2019 novel coronavirus  Assessment and Plan of Treatment: You have been diagnosed with the COVID-19 virus during your hospital stay. You must self quarantine to complete a 14 day time period (THROUGH 4/23/2020).  Monitor for fevers, shortness of breath and cough primarily.  Monitor your temperature daily to not any changes and increases.    It has been determined that you no longer need hospitalization and can recover while remaining in self-quarantine at home. You should follow the prevention steps below until a healthcare provider or local or state health department says you can return to your normal activities.  1. You should restrict activities outside your home, except for getting medical care.  2. Do not go to work, school, or public areas.  3. Avoid using public transportation, ride-sharing, or taxis.  4. Separate yourself from other people and animals in your home.  5. Call ahead before visiting your doctor.  6. Wear a facemask.  7. Cover your coughs and sneezes.  8. Clean your hands often.  9. Avoid sharing personal household items.  10. Clean all “high-touch” surfaces everyday.  11. Monitor your symptoms.  If you have a medical emergency and need to call 911, notify the dispatch personnel that you have COVID-19 If possible, put on a facemask before emergency medical services arrive.  12. Stopping home isolation.  Patients with confirmed COVID-19 should remain under home isolation precautions for 14 days since the positive COVID-19 test and until the risk of secondary transmission to others is thought to be low. The decision to discontinue home isolation precautions should be made on a case-by-case basis, in consultation with healthcare providers and state and local health departments. Your [facility] Department of Health can be reached at [phone] for further information about COVID-19.        SECONDARY DISCHARGE DIAGNOSES  Diagnosis: Cough  Assessment and Plan of Treatment:     Diagnosis: Fever  Assessment and Plan of Treatment: PRINCIPAL DISCHARGE DIAGNOSIS  Diagnosis: Infection due to 2019 novel coronavirus  Assessment and Plan of Treatment: You were found to be positive COVID 19 virus. Please follow full instructions listed in your paperwork. Please self quarantine at home for 14 days from discharge and stay 6 feet away minimum from other individuals or animals. Do not go to work, school, public areas, or use public transportaion. Restrict outside activity except for  medical care. Please call ahead if you have an appointment with your doctor to inform them of your COVID positive status. Always wear a facemask at home. Cover your sneezes and coughs, and wash hands with soap and water for at least 20 seconds frequently. Avoid sharing personal household items. Clean all surfaces where you contact frequently such as coutners and doorknobs frequently.  If you have any worsening breathing, faster breathing or trouble with breathing call 911 immediately or your healthcare provider ahead of time and wear facemask before being seen by medical personel.   Take tylenol for your fevers. Please follow state and local rules and regulations regarding coming off of isolation.

## 2020-04-24 NOTE — CHART NOTE - NSCHARTNOTEFT_GEN_A_CORE
Diet : Diet, Regular:   Supplement Feeding Modality:  Oral  Ensure Enlive Cans or Servings Per Day:  1       Frequency:  Two Times a day (04-20-20 @ 18:07)    Patient is found with COVID-19. RD unable to have face to face encounter with patient to perform nutrition interview and/or nutrition-focused physical exam due to contact/isolation precautions related to COVID-19 and mitigation efforts. Collateral obtained from chart review. Patient currently on Regular diet with PO supplement, but unable to assess PO intake @ present. No GI distress (nausea/vomiting/diarrhea/constipation) or difficulty chewing and swallowing noted. Last bowel movement on 4/24/20 noted.       Weight (kg): 90.1 (09 Apr 2020 08:20) No new weight recorded.       Pertinent Medications: ascorbic acid  pantoprazole    Tablet  zinc sulfate    Pertinent Labs:  04-24 Na135 mmol/L Glu 100 mg/dL<H> K+ 4.2 mmol/L Cr  0.53 mg/dL BUN 13 mg/dL 04-24 Phos 3.2 mg/dL 04-24 Alb 2.8 g/dL<L>      Skin: intact. No edema noted.     Estimated Needs:   [X ] no change since previous assessment  [ ] recalculated:        Additional Recommendations:     1. Continue Regular diet with PO supplement Ensure Enlive 240mls 2x daily (700kcal, 40g protein) for optimal nutrition status.  2. Monitor weights, labs, BM's, skin integrity, p.o. intake.   3. Please Encourage po intake, assist with meals and menu selections, provide alternatives PRN.     RDN remains available, consult nutrition services as needed. Will follow per protocol.

## 2020-04-24 NOTE — PROGRESS NOTE ADULT - PROBLEM SELECTOR PLAN 1
2/2 COVID 19 viral infection/pneumonia  -slowly improving, appreciate ID input  - s/p Plaquenil, steroids, proning, chest PT/IS/albuterol, titrate O2 as able  - completed Anakinra   - continue therapeutic AC 90 bid lovenox SC  -  pt and family not interested in being enrolled in plasma study  - full code

## 2020-04-25 PROCEDURE — 99233 SBSQ HOSP IP/OBS HIGH 50: CPT

## 2020-04-25 RX ADMIN — ZINC SULFATE TAB 220 MG (50 MG ZINC EQUIVALENT) 220 MILLIGRAM(S): 220 (50 ZN) TAB at 11:28

## 2020-04-25 RX ADMIN — PANTOPRAZOLE SODIUM 40 MILLIGRAM(S): 20 TABLET, DELAYED RELEASE ORAL at 05:25

## 2020-04-25 RX ADMIN — Medication 500 MILLIGRAM(S): at 05:24

## 2020-04-25 RX ADMIN — ENOXAPARIN SODIUM 90 MILLIGRAM(S): 100 INJECTION SUBCUTANEOUS at 18:04

## 2020-04-25 RX ADMIN — ENOXAPARIN SODIUM 90 MILLIGRAM(S): 100 INJECTION SUBCUTANEOUS at 05:24

## 2020-04-25 RX ADMIN — Medication 500 MILLIGRAM(S): at 18:04

## 2020-04-25 NOTE — PROGRESS NOTE ADULT - SUBJECTIVE AND OBJECTIVE BOX
Medicine Progress Note    Patient is a 40y old  Male who presents with a chief complaint of Dyspnea (24 Apr 2020 15:35)      SUBJECTIVE / OVERNIGHT EVENTS: pt reports that NC is drying to nose, switched to venturi mask    ADDITIONAL REVIEW OF SYSTEMS:    MEDICATIONS  (STANDING):  ascorbic acid 500 milliGRAM(s) Oral two times a day  enoxaparin Injectable 90 milliGRAM(s) SubCutaneous every 12 hours  pantoprazole    Tablet 40 milliGRAM(s) Oral before breakfast  zinc sulfate 220 milliGRAM(s) Oral daily    MEDICATIONS  (PRN):  acetaminophen   Tablet .. 650 milliGRAM(s) Oral every 4 hours PRN Temp greater or equal to 38.5C (101.3F)  ALBUTerol    90 MICROgram(s) HFA Inhaler 2 Puff(s) Inhalation every 6 hours PRN Shortness of Breath and/or Wheezing  benzonatate 100 milliGRAM(s) Oral three times a day PRN Cough  sodium chloride 0.65% Nasal 1 Spray(s) Both Nostrils four times a day PRN Nasal Congestion    CAPILLARY BLOOD GLUCOSE        I&O's Summary    24 Apr 2020 07:01  -  25 Apr 2020 07:00  --------------------------------------------------------  IN: 0 mL / OUT: 1300 mL / NET: -1300 mL        PHYSICAL EXAM:  Vital Signs Last 24 Hrs  T(C): 36.8 (25 Apr 2020 12:15), Max: 36.9 (24 Apr 2020 22:55)  T(F): 98.3 (25 Apr 2020 12:15), Max: 98.4 (24 Apr 2020 22:55)  HR: 104 (25 Apr 2020 12:15) (100 - 104)  BP: 116/58 (25 Apr 2020 12:15) (110/64 - 116/58)  BP(mean): --  RR: 18 (25 Apr 2020 12:15) (18 - 20)  SpO2: 93% (25 Apr 2020 12:15) (93% - 94%)  CONSTITUTIONAL: NAD, well-developed, well-groomed  ENMT: Moist oral mucosa, no pharyngeal injection or exudates; normal dentition  RESPIRATORY: No accessory muscle use, speaking in full sentences  PSYCH: A+O to person, place, and time; affect appropriate  NEUROLOGY: no focal deficits  SKIN: No rashes; no palpable lesions    LABS:                        14.6   6.55  )-----------( 200      ( 24 Apr 2020 06:30 )             44.8     04-24    135  |  100  |  13  ----------------------------<  100<H>  4.2   |  26  |  0.53    Ca    9.0      24 Apr 2020 06:30  Phos  3.2     04-24  Mg     2.0     04-24    TPro  6.7  /  Alb  2.8<L>  /  TBili  0.3  /  DBili  x   /  AST  19  /  ALT  33  /  AlkPhos  67  04-24              COVID-19 PCR: Detected (09 Apr 2020 02:49)      RADIOLOGY & ADDITIONAL TESTS:  Imaging from Last 24 Hours:    Electrocardiogram/QTc Interval:    COORDINATION OF CARE:  Care Discussed with Consultants/Other Providers:

## 2020-04-25 NOTE — PROGRESS NOTE ADULT - PROBLEM SELECTOR PLAN 1
2/2 COVID 19 viral infection/pneumonia  -slowly improving  - s/p Plaquenil, steroids, proning, chest PT/IS/albuterol, titrate O2 as able  - completed Anakinra   - continue therapeutic AC 90 bid lovenox SC  -  pt and family not interested in being enrolled in plasma study  - full code

## 2020-04-26 PROCEDURE — 99233 SBSQ HOSP IP/OBS HIGH 50: CPT

## 2020-04-26 RX ADMIN — ENOXAPARIN SODIUM 90 MILLIGRAM(S): 100 INJECTION SUBCUTANEOUS at 17:33

## 2020-04-26 RX ADMIN — ZINC SULFATE TAB 220 MG (50 MG ZINC EQUIVALENT) 220 MILLIGRAM(S): 220 (50 ZN) TAB at 14:01

## 2020-04-26 RX ADMIN — Medication 500 MILLIGRAM(S): at 17:32

## 2020-04-26 RX ADMIN — PANTOPRAZOLE SODIUM 40 MILLIGRAM(S): 20 TABLET, DELAYED RELEASE ORAL at 06:38

## 2020-04-26 RX ADMIN — Medication 500 MILLIGRAM(S): at 06:38

## 2020-04-26 RX ADMIN — ENOXAPARIN SODIUM 90 MILLIGRAM(S): 100 INJECTION SUBCUTANEOUS at 06:45

## 2020-04-26 NOTE — PROGRESS NOTE ADULT - SUBJECTIVE AND OBJECTIVE BOX
[facility] Division of Hospital Medicine  Cheryl Montes MD  Pager (M-F, 8A-5P):  In-house pager [phone]; Long-range pager [phone]  Other Times:  Please page Hospitalist in Charge -  In-house pager [phone]    Patient is a 40y old  Male who presents with a chief complaint of Dyspnea (25 Apr 2020 12:52)      SUBJECTIVE / OVERNIGHT EVENTS: ...  ADDITIONAL REVIEW OF SYSTEMS:    MEDICATIONS  (STANDING):  ascorbic acid 500 milliGRAM(s) Oral two times a day  enoxaparin Injectable 90 milliGRAM(s) SubCutaneous every 12 hours  pantoprazole    Tablet 40 milliGRAM(s) Oral before breakfast  zinc sulfate 220 milliGRAM(s) Oral daily    MEDICATIONS  (PRN):  acetaminophen   Tablet .. 650 milliGRAM(s) Oral every 4 hours PRN Temp greater or equal to 38.5C (101.3F)  ALBUTerol    90 MICROgram(s) HFA Inhaler 2 Puff(s) Inhalation every 6 hours PRN Shortness of Breath and/or Wheezing  benzonatate 100 milliGRAM(s) Oral three times a day PRN Cough  sodium chloride 0.65% Nasal 1 Spray(s) Both Nostrils four times a day PRN Nasal Congestion      CAPILLARY BLOOD GLUCOSE        I&O's Summary      PHYSICAL EXAM:  Vital Signs Last 24 Hrs  T(C): 36.6 (25 Apr 2020 20:00), Max: 36.8 (25 Apr 2020 12:15)  T(F): 97.9 (25 Apr 2020 20:00), Max: 98.3 (25 Apr 2020 12:15)  HR: 109 (25 Apr 2020 20:00) (104 - 109)  BP: 114/59 (25 Apr 2020 20:00) (114/59 - 116/58)  BP(mean): --  RR: 18 (25 Apr 2020 20:00) (18 - 18)  SpO2: 97% (25 Apr 2020 20:00) (93% - 98%)    CONSTITUTIONAL: NAD, well-developed, well-groomed  ENMT: Moist oral mucosa, no pharyngeal injection or exudates; normal dentition  RESPIRATORY: No accessory muscle use, speaking in full sentences  PSYCH: A+O to person, place, and time; affect appropriate  NEUROLOGY: no focal deficits  SKIN: No rashes; no palpable lesions    LABS:                      RADIOLOGY & ADDITIONAL TESTS:  Results Reviewed:   Imaging Personally Reviewed:  Electrocardiogram Personally Reviewed:    COORDINATION OF CARE:  Care Discussed with Consultants/Other Providers [Y/N]: RN, SW, CM, ACP re overall care   Prior or Outpatient Records Reviewed [Y/N]: [facility] Division of Hospital Medicine  Cheryl Montes MD  Pager (M-F, 8A-5P):  In-house pager [phone]; Long-range pager [phone]  Other Times:  Please page Hospitalist in Charge -  In-house pager [phone]    Patient is a 40y old  Male who presents with a chief complaint of Dyspnea (25 Apr 2020 12:52)      SUBJECTIVE / OVERNIGHT EVENTS: Pt seen earlier, doing better overall.  Still require 6L NC.  Normal BM and urination.  ADDITIONAL REVIEW OF SYSTEMS:    MEDICATIONS  (STANDING):  ascorbic acid 500 milliGRAM(s) Oral two times a day  enoxaparin Injectable 90 milliGRAM(s) SubCutaneous every 12 hours  pantoprazole    Tablet 40 milliGRAM(s) Oral before breakfast  zinc sulfate 220 milliGRAM(s) Oral daily    MEDICATIONS  (PRN):  acetaminophen   Tablet .. 650 milliGRAM(s) Oral every 4 hours PRN Temp greater or equal to 38.5C (101.3F)  ALBUTerol    90 MICROgram(s) HFA Inhaler 2 Puff(s) Inhalation every 6 hours PRN Shortness of Breath and/or Wheezing  benzonatate 100 milliGRAM(s) Oral three times a day PRN Cough  sodium chloride 0.65% Nasal 1 Spray(s) Both Nostrils four times a day PRN Nasal Congestion      CAPILLARY BLOOD GLUCOSE        I&O's Summary      PHYSICAL EXAM:  Vital Signs Last 24 Hrs  T(C): 36.6 (25 Apr 2020 20:00), Max: 36.8 (25 Apr 2020 12:15)  T(F): 97.9 (25 Apr 2020 20:00), Max: 98.3 (25 Apr 2020 12:15)  HR: 109 (25 Apr 2020 20:00) (104 - 109)  BP: 114/59 (25 Apr 2020 20:00) (114/59 - 116/58)  BP(mean): --  RR: 18 (25 Apr 2020 20:00) (18 - 18)  SpO2: 97% (25 Apr 2020 20:00) (93% - 98%)    CONSTITUTIONAL: NAD, well-developed, well-groomed  ENMT: Moist oral mucosa, no pharyngeal injection or exudates; normal dentition  RESPIRATORY: No accessory muscle use, speaking in full sentences, clear lungs  PSYCH: A+O to person, place, and time; affect appropriate  NEUROLOGY: no focal deficits  SKIN: No rashes; no palpable lesions    LABS:                      RADIOLOGY & ADDITIONAL TESTS:  Results Reviewed:   Imaging Personally Reviewed:  Electrocardiogram Personally Reviewed:    COORDINATION OF CARE:  Care Discussed with Consultants/Other Providers [Y/N]: RN, SW, CM, ACP re overall care   Prior or Outpatient Records Reviewed [Y/N]:

## 2020-04-27 LAB
ALBUMIN SERPL ELPH-MCNC: 3.4 G/DL — SIGNIFICANT CHANGE UP (ref 3.3–5)
ALP SERPL-CCNC: 75 U/L — SIGNIFICANT CHANGE UP (ref 40–120)
ALT FLD-CCNC: 54 U/L — HIGH (ref 4–41)
ANION GAP SERPL CALC-SCNC: 12 MMO/L — SIGNIFICANT CHANGE UP (ref 7–14)
AST SERPL-CCNC: 23 U/L — SIGNIFICANT CHANGE UP (ref 4–40)
BASOPHILS # BLD AUTO: 0.04 K/UL — SIGNIFICANT CHANGE UP (ref 0–0.2)
BASOPHILS NFR BLD AUTO: 0.6 % — SIGNIFICANT CHANGE UP (ref 0–2)
BILIRUB SERPL-MCNC: 0.3 MG/DL — SIGNIFICANT CHANGE UP (ref 0.2–1.2)
BUN SERPL-MCNC: 13 MG/DL — SIGNIFICANT CHANGE UP (ref 7–23)
CALCIUM SERPL-MCNC: 9.3 MG/DL — SIGNIFICANT CHANGE UP (ref 8.4–10.5)
CHLORIDE SERPL-SCNC: 101 MMOL/L — SIGNIFICANT CHANGE UP (ref 98–107)
CO2 SERPL-SCNC: 26 MMOL/L — SIGNIFICANT CHANGE UP (ref 22–31)
CREAT SERPL-MCNC: 0.6 MG/DL — SIGNIFICANT CHANGE UP (ref 0.5–1.3)
CRP SERPL-MCNC: 4.4 MG/L — SIGNIFICANT CHANGE UP
EOSINOPHIL # BLD AUTO: 0.47 K/UL — SIGNIFICANT CHANGE UP (ref 0–0.5)
EOSINOPHIL NFR BLD AUTO: 7 % — HIGH (ref 0–6)
FERRITIN SERPL-MCNC: 502.2 NG/ML — HIGH (ref 30–400)
GLUCOSE SERPL-MCNC: 103 MG/DL — HIGH (ref 70–99)
HCT VFR BLD CALC: 46.7 % — SIGNIFICANT CHANGE UP (ref 39–50)
HGB BLD-MCNC: 15 G/DL — SIGNIFICANT CHANGE UP (ref 13–17)
IMM GRANULOCYTES NFR BLD AUTO: 0.1 % — SIGNIFICANT CHANGE UP (ref 0–1.5)
LYMPHOCYTES # BLD AUTO: 1.86 K/UL — SIGNIFICANT CHANGE UP (ref 1–3.3)
LYMPHOCYTES # BLD AUTO: 27.8 % — SIGNIFICANT CHANGE UP (ref 13–44)
MCHC RBC-ENTMCNC: 28.3 PG — SIGNIFICANT CHANGE UP (ref 27–34)
MCHC RBC-ENTMCNC: 32.1 % — SIGNIFICANT CHANGE UP (ref 32–36)
MCV RBC AUTO: 88.1 FL — SIGNIFICANT CHANGE UP (ref 80–100)
MONOCYTES # BLD AUTO: 0.53 K/UL — SIGNIFICANT CHANGE UP (ref 0–0.9)
MONOCYTES NFR BLD AUTO: 7.9 % — SIGNIFICANT CHANGE UP (ref 2–14)
NEUTROPHILS # BLD AUTO: 3.79 K/UL — SIGNIFICANT CHANGE UP (ref 1.8–7.4)
NEUTROPHILS NFR BLD AUTO: 56.6 % — SIGNIFICANT CHANGE UP (ref 43–77)
NRBC # FLD: 0 K/UL — SIGNIFICANT CHANGE UP (ref 0–0)
PLATELET # BLD AUTO: 161 K/UL — SIGNIFICANT CHANGE UP (ref 150–400)
PMV BLD: 11 FL — SIGNIFICANT CHANGE UP (ref 7–13)
POTASSIUM SERPL-MCNC: 4.3 MMOL/L — SIGNIFICANT CHANGE UP (ref 3.5–5.3)
POTASSIUM SERPL-SCNC: 4.3 MMOL/L — SIGNIFICANT CHANGE UP (ref 3.5–5.3)
PROCALCITONIN SERPL-MCNC: 0.05 NG/ML — SIGNIFICANT CHANGE UP (ref 0.02–0.1)
PROT SERPL-MCNC: 7 G/DL — SIGNIFICANT CHANGE UP (ref 6–8.3)
RBC # BLD: 5.3 M/UL — SIGNIFICANT CHANGE UP (ref 4.2–5.8)
RBC # FLD: 13.1 % — SIGNIFICANT CHANGE UP (ref 10.3–14.5)
SODIUM SERPL-SCNC: 139 MMOL/L — SIGNIFICANT CHANGE UP (ref 135–145)
WBC # BLD: 6.7 K/UL — SIGNIFICANT CHANGE UP (ref 3.8–10.5)
WBC # FLD AUTO: 6.7 K/UL — SIGNIFICANT CHANGE UP (ref 3.8–10.5)

## 2020-04-27 PROCEDURE — 71275 CT ANGIOGRAPHY CHEST: CPT | Mod: 26

## 2020-04-27 PROCEDURE — 99233 SBSQ HOSP IP/OBS HIGH 50: CPT

## 2020-04-27 RX ADMIN — ENOXAPARIN SODIUM 90 MILLIGRAM(S): 100 INJECTION SUBCUTANEOUS at 05:36

## 2020-04-27 RX ADMIN — ZINC SULFATE TAB 220 MG (50 MG ZINC EQUIVALENT) 220 MILLIGRAM(S): 220 (50 ZN) TAB at 12:45

## 2020-04-27 RX ADMIN — Medication 500 MILLIGRAM(S): at 05:36

## 2020-04-27 RX ADMIN — PANTOPRAZOLE SODIUM 40 MILLIGRAM(S): 20 TABLET, DELAYED RELEASE ORAL at 05:37

## 2020-04-27 RX ADMIN — Medication 500 MILLIGRAM(S): at 17:14

## 2020-04-27 RX ADMIN — ENOXAPARIN SODIUM 90 MILLIGRAM(S): 100 INJECTION SUBCUTANEOUS at 17:16

## 2020-04-27 NOTE — PROGRESS NOTE ADULT - NSREFPHYEXREFTO_GEN_ALL_CORE
Inpatient Physical Exam

## 2020-04-27 NOTE — PROGRESS NOTE ADULT - SUBJECTIVE AND OBJECTIVE BOX
[facility] Division of Hospital Medicine  Cheryl Montes MD  Pager (M-F, 8A-5P):  In-house pager [phone]; Long-range pager [phone]  Other Times:  Please page Hospitalist in Charge -  In-house pager [phone]    Patient is a 40y old  Male who presents with a chief complaint of Dyspnea (26 Apr 2020 09:05)      SUBJECTIVE / OVERNIGHT EVENTS: ...  ADDITIONAL REVIEW OF SYSTEMS:    MEDICATIONS  (STANDING):  ascorbic acid 500 milliGRAM(s) Oral two times a day  enoxaparin Injectable 90 milliGRAM(s) SubCutaneous every 12 hours  pantoprazole    Tablet 40 milliGRAM(s) Oral before breakfast  zinc sulfate 220 milliGRAM(s) Oral daily    MEDICATIONS  (PRN):  acetaminophen   Tablet .. 650 milliGRAM(s) Oral every 4 hours PRN Temp greater or equal to 38.5C (101.3F)  ALBUTerol    90 MICROgram(s) HFA Inhaler 2 Puff(s) Inhalation every 6 hours PRN Shortness of Breath and/or Wheezing  benzonatate 100 milliGRAM(s) Oral three times a day PRN Cough  sodium chloride 0.65% Nasal 1 Spray(s) Both Nostrils four times a day PRN Nasal Congestion      CAPILLARY BLOOD GLUCOSE        I&O's Summary      PHYSICAL EXAM:  Vital Signs Last 24 Hrs  T(C): 36.4 (26 Apr 2020 20:11), Max: 36.7 (26 Apr 2020 14:01)  T(F): 97.5 (26 Apr 2020 20:11), Max: 98 (26 Apr 2020 14:01)  HR: 108 (26 Apr 2020 20:11) (99 - 108)  BP: 119/65 (26 Apr 2020 20:11) (119/65 - 119/70)  BP(mean): --  RR: 19 (26 Apr 2020 20:11) (17 - 19)  SpO2: 100% (26 Apr 2020 20:11) (99% - 100%)    CONSTITUTIONAL: NAD, well-developed, well-groomed  ENMT: Moist oral mucosa, no pharyngeal injection or exudates; normal dentition  RESPIRATORY: No accessory muscle use, speaking in full sentences, clear lungs  PSYCH: A+O to person, place, and time; affect appropriate  NEUROLOGY: no focal deficits  SKIN: No rashes; no palpable lesions    LABS:                        15.0   6.70  )-----------( 161      ( 27 Apr 2020 06:00 )             46.7     04-27    139  |  101  |  13  ----------------------------<  103<H>  4.3   |  26  |  0.60    Ca    9.3      27 Apr 2020 06:00    TPro  7.0  /  Alb  3.4  /  TBili  0.3  /  DBili  x   /  AST  23  /  ALT  54<H>  /  AlkPhos  75  04-27                RADIOLOGY & ADDITIONAL TESTS:  Results Reviewed:   Imaging Personally Reviewed:  Electrocardiogram Personally Reviewed:    COORDINATION OF CARE:  Care Discussed with Consultants/Other Providers [Y/N]: RN, SW, CM, ACP re overall care   Prior or Outpatient Records Reviewed [Y/N]:  \ [facility] Division of Hospital Medicine  Cheryl Montes MD  Pager (M-F, 8A-5P):  In-house pager [phone]; Long-range pager [phone]  Other Times:  Please page Hospitalist in Charge -  In-house pager [phone]    Patient is a 40y old  Male who presents with a chief complaint of Dyspnea (26 Apr 2020 09:05)      SUBJECTIVE / OVERNIGHT EVENTS: Pt seen earlier today with niece Nellie, she translated.  Pt continues to feel better.  Not gotten OOB since last week, when did then was very weak.  Explained need to check CT to r/o PE, has been on Rx.    ADDITIONAL REVIEW OF SYSTEMS:    MEDICATIONS  (STANDING):  ascorbic acid 500 milliGRAM(s) Oral two times a day  enoxaparin Injectable 90 milliGRAM(s) SubCutaneous every 12 hours  pantoprazole    Tablet 40 milliGRAM(s) Oral before breakfast  zinc sulfate 220 milliGRAM(s) Oral daily    MEDICATIONS  (PRN):  acetaminophen   Tablet .. 650 milliGRAM(s) Oral every 4 hours PRN Temp greater or equal to 38.5C (101.3F)  ALBUTerol    90 MICROgram(s) HFA Inhaler 2 Puff(s) Inhalation every 6 hours PRN Shortness of Breath and/or Wheezing  benzonatate 100 milliGRAM(s) Oral three times a day PRN Cough  sodium chloride 0.65% Nasal 1 Spray(s) Both Nostrils four times a day PRN Nasal Congestion      CAPILLARY BLOOD GLUCOSE        I&O's Summary      PHYSICAL EXAM:  Vital Signs Last 24 Hrs  T(C): 36.4 (26 Apr 2020 20:11), Max: 36.7 (26 Apr 2020 14:01)  T(F): 97.5 (26 Apr 2020 20:11), Max: 98 (26 Apr 2020 14:01)  HR: 108 (26 Apr 2020 20:11) (99 - 108)  BP: 119/65 (26 Apr 2020 20:11) (119/65 - 119/70)  BP(mean): --  RR: 19 (26 Apr 2020 20:11) (17 - 19)  SpO2: 100% (26 Apr 2020 20:11) (99% - 100%)    CONSTITUTIONAL: NAD, well-developed, well-groomed  ENMT: Moist oral mucosa, no pharyngeal injection or exudates; normal dentition  RESPIRATORY: No accessory muscle use, speaking in full sentences, clear lungs  PSYCH: A+O to person, place, and time; affect appropriate  NEUROLOGY: no focal deficits  SKIN: No rashes; no palpable lesions    LABS:                        15.0   6.70  )-----------( 161      ( 27 Apr 2020 06:00 )             46.7     04-27    139  |  101  |  13  ----------------------------<  103<H>  4.3   |  26  |  0.60    Ca    9.3      27 Apr 2020 06:00    TPro  7.0  /  Alb  3.4  /  TBili  0.3  /  DBili  x   /  AST  23  /  ALT  54<H>  /  AlkPhos  75  04-27                RADIOLOGY & ADDITIONAL TESTS:  Results Reviewed:   Imaging Personally Reviewed:  Electrocardiogram Personally Reviewed:    COORDINATION OF CARE:  Care Discussed with Consultants/Other Providers [Y/N]: RN, SW, CM, ACP re overall care   Prior or Outpatient Records Reviewed [Y/N]:

## 2020-04-27 NOTE — PROGRESS NOTE ADULT - PROBLEM SELECTOR PLAN 1
2/2 COVID 19 viral infection/pneumonia  -slowly improving  - s/p Plaquenil, steroids, proning, chest PT/IS/albuterol, titrate O2 as able  - completed Anakinra   - continue therapeutic AC 90 bid lovenox SC  -  pt and family not interested in being enrolled in plasma study  - full code 2/2 COVID 19 viral infection/pneumonia  - slowly improving  - s/p Plaquenil, steroids, proning, chest PT/IS/albuterol, titrate O2 as able  - completed Anakinra   - continue therapeutic AC 90 bid lovenox SC --> CTPA to r/o PE, d/w niece (who translated) and pt re risks/benefits  -  pt and family not interested in being enrolled in plasma study  - full code

## 2020-04-28 PROCEDURE — 99233 SBSQ HOSP IP/OBS HIGH 50: CPT

## 2020-04-28 RX ADMIN — ZINC SULFATE TAB 220 MG (50 MG ZINC EQUIVALENT) 220 MILLIGRAM(S): 220 (50 ZN) TAB at 11:12

## 2020-04-28 RX ADMIN — ENOXAPARIN SODIUM 90 MILLIGRAM(S): 100 INJECTION SUBCUTANEOUS at 17:24

## 2020-04-28 RX ADMIN — Medication 500 MILLIGRAM(S): at 05:24

## 2020-04-28 RX ADMIN — Medication 500 MILLIGRAM(S): at 17:24

## 2020-04-28 RX ADMIN — PANTOPRAZOLE SODIUM 40 MILLIGRAM(S): 20 TABLET, DELAYED RELEASE ORAL at 05:24

## 2020-04-28 RX ADMIN — Medication 100 MILLIGRAM(S): at 17:23

## 2020-04-28 RX ADMIN — ENOXAPARIN SODIUM 90 MILLIGRAM(S): 100 INJECTION SUBCUTANEOUS at 05:26

## 2020-04-28 NOTE — PHYSICAL THERAPY INITIAL EVALUATION ADULT - CRITERIA FOR SKILLED THERAPEUTIC INTERVENTIONS
risk reduction/prevention/therapy frequency/rehab potential/anticipated discharge recommendation/predicted duration of therapy intervention/impairments found/functional limitations in following categories

## 2020-04-28 NOTE — PROGRESS NOTE ADULT - SUBJECTIVE AND OBJECTIVE BOX
Medicine Progress Note    Patient is a 40y old  Male who presents with a chief complaint of Dyspnea (27 Apr 2020 09:07)      SUBJECTIVE / OVERNIGHT EVENTS: appears comfortable, on 2L NC, no SOB    ADDITIONAL REVIEW OF SYSTEMS:    MEDICATIONS  (STANDING):  ascorbic acid 500 milliGRAM(s) Oral two times a day  enoxaparin Injectable 90 milliGRAM(s) SubCutaneous every 12 hours  pantoprazole    Tablet 40 milliGRAM(s) Oral before breakfast  zinc sulfate 220 milliGRAM(s) Oral daily    MEDICATIONS  (PRN):  acetaminophen   Tablet .. 650 milliGRAM(s) Oral every 4 hours PRN Temp greater or equal to 38.5C (101.3F)  ALBUTerol    90 MICROgram(s) HFA Inhaler 2 Puff(s) Inhalation every 6 hours PRN Shortness of Breath and/or Wheezing  benzonatate 100 milliGRAM(s) Oral three times a day PRN Cough  sodium chloride 0.65% Nasal 1 Spray(s) Both Nostrils four times a day PRN Nasal Congestion    CAPILLARY BLOOD GLUCOSE        I&O's Summary    27 Apr 2020 07:01  -  28 Apr 2020 07:00  --------------------------------------------------------  IN: 0 mL / OUT: 350 mL / NET: -350 mL        PHYSICAL EXAM:  Vital Signs Last 24 Hrs  T(C): 36.5 (28 Apr 2020 13:13), Max: 37.2 (27 Apr 2020 20:00)  T(F): 97.7 (28 Apr 2020 13:13), Max: 99 (27 Apr 2020 20:00)  HR: 107 (28 Apr 2020 13:13) (96 - 107)  BP: 118/68 (28 Apr 2020 13:13) (112/72 - 122/78)  BP(mean): --  RR: 19 (28 Apr 2020 13:13) (19 - 19)  SpO2: 96% (28 Apr 2020 13:13) (96% - 99%)  CONSTITUTIONAL: NAD, well-developed, well-groomed  ENMT: Moist oral mucosa, no pharyngeal injection or exudates; normal dentition  RESPIRATORY: No accessory muscle use, speaking in full sentences  SKIN: No rashes; no palpable lesions    LABS:                        15.0   6.70  )-----------( 161      ( 27 Apr 2020 06:00 )             46.7     04-27    139  |  101  |  13  ----------------------------<  103<H>  4.3   |  26  |  0.60    Ca    9.3      27 Apr 2020 06:00    TPro  7.0  /  Alb  3.4  /  TBili  0.3  /  DBili  x   /  AST  23  /  ALT  54<H>  /  AlkPhos  75  04-27              COVID-19 PCR: Detected (09 Apr 2020 02:49)      RADIOLOGY & ADDITIONAL TESTS:  Imaging from Last 24 Hours:    Electrocardiogram/QTc Interval:    COORDINATION OF CARE:  Care Discussed with Consultants/Other Providers:

## 2020-04-28 NOTE — PHYSICAL THERAPY INITIAL EVALUATION ADULT - ADDITIONAL COMMENTS
SpO2 on RA at rest 90%, on RA walking 82% with reports of SOB  SpO2 +1L O2 via NC at rest 93%, walking 85% with SOB

## 2020-04-28 NOTE — PROGRESS NOTE ADULT - PROBLEM SELECTOR PLAN 1
2/2 COVID 19 viral infection/pneumonia  - continues to improve, PT to assess, check ambulatory pox  - s/p Plaquenil, steroids, proning, chest PT/IS/albuterol, titrate O2 as able  - completed Anakinra   - continue therapeutic AC 90 bid lovenox SC --> CTPA to r/o PE, d/w niece (who translated) and pt re risks/benefits; CT negative, will place on prophylactic AC for 30 days at discharge, reviewed s/e of AC with patient  -  pt and family not interested in being enrolled in plasma study  - full code

## 2020-04-29 VITALS
DIASTOLIC BLOOD PRESSURE: 74 MMHG | OXYGEN SATURATION: 95 % | TEMPERATURE: 97 F | SYSTOLIC BLOOD PRESSURE: 113 MMHG | RESPIRATION RATE: 20 BRPM | HEART RATE: 99 BPM

## 2020-04-29 LAB
ALBUMIN SERPL ELPH-MCNC: 3.3 G/DL — SIGNIFICANT CHANGE UP (ref 3.3–5)
ALP SERPL-CCNC: 72 U/L — SIGNIFICANT CHANGE UP (ref 40–120)
ALT FLD-CCNC: 55 U/L — HIGH (ref 4–41)
ANION GAP SERPL CALC-SCNC: 13 MMO/L — SIGNIFICANT CHANGE UP (ref 7–14)
AST SERPL-CCNC: 25 U/L — SIGNIFICANT CHANGE UP (ref 4–40)
BILIRUB SERPL-MCNC: 0.2 MG/DL — SIGNIFICANT CHANGE UP (ref 0.2–1.2)
BUN SERPL-MCNC: 12 MG/DL — SIGNIFICANT CHANGE UP (ref 7–23)
CALCIUM SERPL-MCNC: 9.3 MG/DL — SIGNIFICANT CHANGE UP (ref 8.4–10.5)
CHLORIDE SERPL-SCNC: 100 MMOL/L — SIGNIFICANT CHANGE UP (ref 98–107)
CO2 SERPL-SCNC: 25 MMOL/L — SIGNIFICANT CHANGE UP (ref 22–31)
CREAT SERPL-MCNC: 0.51 MG/DL — SIGNIFICANT CHANGE UP (ref 0.5–1.3)
CRP SERPL-MCNC: < 4 MG/L — SIGNIFICANT CHANGE UP
FERRITIN SERPL-MCNC: 448.8 NG/ML — HIGH (ref 30–400)
GLUCOSE SERPL-MCNC: 101 MG/DL — HIGH (ref 70–99)
HCT VFR BLD CALC: 45.1 % — SIGNIFICANT CHANGE UP (ref 39–50)
HGB BLD-MCNC: 14.9 G/DL — SIGNIFICANT CHANGE UP (ref 13–17)
LDH SERPL L TO P-CCNC: 235 U/L — HIGH (ref 135–225)
MAGNESIUM SERPL-MCNC: 1.9 MG/DL — SIGNIFICANT CHANGE UP (ref 1.6–2.6)
MCHC RBC-ENTMCNC: 28.7 PG — SIGNIFICANT CHANGE UP (ref 27–34)
MCHC RBC-ENTMCNC: 33 % — SIGNIFICANT CHANGE UP (ref 32–36)
MCV RBC AUTO: 86.7 FL — SIGNIFICANT CHANGE UP (ref 80–100)
NRBC # FLD: 0 K/UL — SIGNIFICANT CHANGE UP (ref 0–0)
PHOSPHATE SERPL-MCNC: 4 MG/DL — SIGNIFICANT CHANGE UP (ref 2.5–4.5)
PLATELET # BLD AUTO: 163 K/UL — SIGNIFICANT CHANGE UP (ref 150–400)
PMV BLD: 10.8 FL — SIGNIFICANT CHANGE UP (ref 7–13)
POTASSIUM SERPL-MCNC: 4.7 MMOL/L — SIGNIFICANT CHANGE UP (ref 3.5–5.3)
POTASSIUM SERPL-SCNC: 4.7 MMOL/L — SIGNIFICANT CHANGE UP (ref 3.5–5.3)
PROT SERPL-MCNC: 7 G/DL — SIGNIFICANT CHANGE UP (ref 6–8.3)
RBC # BLD: 5.2 M/UL — SIGNIFICANT CHANGE UP (ref 4.2–5.8)
RBC # FLD: 13.4 % — SIGNIFICANT CHANGE UP (ref 10.3–14.5)
SODIUM SERPL-SCNC: 138 MMOL/L — SIGNIFICANT CHANGE UP (ref 135–145)
WBC # BLD: 6.57 K/UL — SIGNIFICANT CHANGE UP (ref 3.8–10.5)
WBC # FLD AUTO: 6.57 K/UL — SIGNIFICANT CHANGE UP (ref 3.8–10.5)

## 2020-04-29 PROCEDURE — 99239 HOSP IP/OBS DSCHRG MGMT >30: CPT

## 2020-04-29 RX ORDER — ALBUTEROL 90 UG/1
2 AEROSOL, METERED ORAL
Qty: 1 | Refills: 0
Start: 2020-04-29

## 2020-04-29 RX ORDER — ASCORBIC ACID 60 MG
1 TABLET,CHEWABLE ORAL
Qty: 0 | Refills: 0 | DISCHARGE
Start: 2020-04-29

## 2020-04-29 RX ORDER — RIVAROXABAN 15 MG-20MG
1 KIT ORAL
Qty: 30 | Refills: 0
Start: 2020-04-29 | End: 2020-05-28

## 2020-04-29 RX ORDER — FAMOTIDINE 10 MG/ML
1 INJECTION INTRAVENOUS
Qty: 0 | Refills: 0 | DISCHARGE

## 2020-04-29 RX ORDER — PANTOPRAZOLE SODIUM 20 MG/1
1 TABLET, DELAYED RELEASE ORAL
Qty: 30 | Refills: 0
Start: 2020-04-29

## 2020-04-29 RX ORDER — ACETAMINOPHEN 500 MG
2 TABLET ORAL
Qty: 0 | Refills: 0 | DISCHARGE
Start: 2020-04-29

## 2020-04-29 RX ADMIN — PANTOPRAZOLE SODIUM 40 MILLIGRAM(S): 20 TABLET, DELAYED RELEASE ORAL at 06:09

## 2020-04-29 RX ADMIN — Medication 500 MILLIGRAM(S): at 06:09

## 2020-04-29 RX ADMIN — ZINC SULFATE TAB 220 MG (50 MG ZINC EQUIVALENT) 220 MILLIGRAM(S): 220 (50 ZN) TAB at 10:59

## 2020-04-29 RX ADMIN — ENOXAPARIN SODIUM 90 MILLIGRAM(S): 100 INJECTION SUBCUTANEOUS at 06:09

## 2020-04-29 NOTE — PROGRESS NOTE ADULT - PROBLEM SELECTOR PROBLEM 2
Acute respiratory failure with hypoxia
Discharge planning issues
Sepsis, due to unspecified organism, unspecified whether acute organ dysfunction present
Discharge planning issues

## 2020-04-29 NOTE — DISCHARGE NOTE NURSING/CASE MANAGEMENT/SOCIAL WORK - NSSCTYPOFSERV_GEN_ALL_CORE
Visiting Nurse Services. The Initial Visit will be via Telehealth/Virtual. The Nurse will contact you.

## 2020-04-29 NOTE — PROGRESS NOTE ADULT - SUBJECTIVE AND OBJECTIVE BOX
Medicine Progress Note    Patient is a 40y old  Male who presents with a chief complaint of Dyspnea (28 Apr 2020 13:53)      SUBJECTIVE / OVERNIGHT EVENTS: excited to go home    ADDITIONAL REVIEW OF SYSTEMS:    MEDICATIONS  (STANDING):  ascorbic acid 500 milliGRAM(s) Oral two times a day  enoxaparin Injectable 90 milliGRAM(s) SubCutaneous every 12 hours  pantoprazole    Tablet 40 milliGRAM(s) Oral before breakfast  zinc sulfate 220 milliGRAM(s) Oral daily    MEDICATIONS  (PRN):  acetaminophen   Tablet .. 650 milliGRAM(s) Oral every 4 hours PRN Temp greater or equal to 38.5C (101.3F)  ALBUTerol    90 MICROgram(s) HFA Inhaler 2 Puff(s) Inhalation every 6 hours PRN Shortness of Breath and/or Wheezing  benzonatate 100 milliGRAM(s) Oral three times a day PRN Cough  sodium chloride 0.65% Nasal 1 Spray(s) Both Nostrils four times a day PRN Nasal Congestion    CAPILLARY BLOOD GLUCOSE        I&O's Summary      PHYSICAL EXAM:  Vital Signs Last 24 Hrs  T(C): 36.3 (29 Apr 2020 11:00), Max: 37.8 (28 Apr 2020 22:00)  T(F): 97.4 (29 Apr 2020 11:00), Max: 100.1 (28 Apr 2020 22:00)  HR: 99 (29 Apr 2020 11:00) (91 - 122)  BP: 113/74 (29 Apr 2020 11:00) (106/68 - 123/73)  BP(mean): --  RR: 20 (29 Apr 2020 11:00) (19 - 20)  SpO2: 95% (29 Apr 2020 11:00) (88% - 96%)  CONSTITUTIONAL: NAD, well-developed, well-groomed  ENMT: Moist oral mucosa, no pharyngeal injection or exudates; normal dentition  RESPIRATORY: No accessory muscle use, speaking in full sentences  PSYCH: A+O to person, place, and time; affect appropriate  SKIN: No rashes; no palpable lesions    LABS:                        14.9   6.57  )-----------( 163      ( 29 Apr 2020 06:21 )             45.1     04-29    138  |  100  |  12  ----------------------------<  101<H>  4.7   |  25  |  0.51    Ca    9.3      29 Apr 2020 06:21  Phos  4.0     04-29  Mg     1.9     04-29    TPro  7.0  /  Alb  3.3  /  TBili  0.2  /  DBili  x   /  AST  25  /  ALT  55<H>  /  AlkPhos  72  04-29              COVID-19 PCR: Detected (09 Apr 2020 02:49)      RADIOLOGY & ADDITIONAL TESTS:  Imaging from Last 24 Hours:    Electrocardiogram/QTc Interval:    COORDINATION OF CARE:  Care Discussed with Consultants/Other Providers:

## 2020-04-29 NOTE — PROGRESS NOTE ADULT - PROBLEM SELECTOR PLAN 1
2/2 COVID 19 viral infection/pneumonia  - continues to improve, to home with home o2, will need close o/p follow up  - s/p Plaquenil, steroids  - completed Anakinra   - transition to prophylactic Xarelto on discharge to complete 30 days of treatment, s/e reviewed with patient 2/2 COVID 19 viral infection/pneumonia  - continues to improve, to home with home o2, will need close o/p follow up  - s/p Plaquenil, steroids  - completed Anakinra   - transition to prophylactic Xarelto on discharge to complete 30 days of treatment, s/e reviewed with patient, >35 minutes on discharge plan of care

## 2020-04-29 NOTE — PROGRESS NOTE ADULT - REASON FOR ADMISSION
Dyspnea

## 2020-04-29 NOTE — PROGRESS NOTE ADULT - PROBLEM SELECTOR PLAN 2
1.  Name of PCP: Dr. Bharathi Ramirez in [place]  2.  PCP Contacted on Admission: [ ] Y    [x ] N    3.  PCP contacted at Discharge: [ ] Y    [ ] N    [ ] N/A  4.  Post-Discharge Appointment Date and Location:  5.  Summary of Handoff given to PCP:
1.  Name of PCP: Dr. Bharathi Ramirez in [place]  2.  PCP Contacted on Admission: [ ] Y    [x ] N    3.  PCP contacted at Discharge: [ ] Y    [ ] N    [ ] N/A  4.  Post-Discharge Appointment Date and Location:  5.  Summary of Handoff given to PCP:
1.  Name of PCP: Dr. Bharathi Ramirez in [place]  2.  PCP Contacted on Admission: [ ] Y    [x ] N    3.  PCP contacted at Discharge: [ ] Y    [ ] N    [ ] N/A  4.  Post-Discharge Appointment Date and Location:  5.  Summary of Handoff given to PCP:
1.  Name of PCP: Dr. Bharathi Ramirez in [place]  2.  PCP Contacted on Admission: [ ] Y    [x ] N    3.  PCP contacted at Discharge: [ ] Y    [ ] N    [ ] N/A  4.  Post-Discharge Appointment Date and Location:  5.  Summary of Handoff given to PCP:
1.  Name of PCP: Dr. Bharathi Ramirez in [place]  2.  PCP Contacted on Admission: [ ] Y    [x ] N    3.  PCP contacted at Discharge: [ ] Y    [ ] N    [ ] N/A  4.  Post-Discharge Appointment Date and Location:  5.  Summary of Handoff given to PCP:
1.  Name of PCP: Dr. Bharathi Ramirez in [place]  2.  PCP Contacted on Admission: [ ] Y    [x ] N    3.  PCP contacted at Discharge: [ ] Y    [ ] N    [ ] N/A  4.  Post-Discharge Appointment Date and Location:  5.  Summary of Handoff given to PCP:
1.  Name of PCP: Dr. Bharathi Ramirez in [place]  2.  PCP Contacted on Admission: [ ] Y    [x ] N    3.  PCP contacted at Discharge: [ ] Y    [ ] N    [ ] N/A  4.  Post-Discharge Appointment Date and Location:  5.  Summary of Handoff given to PCP:
1.  Name of PCP: Dr. Bharathi Ramirez in [place]  2.  PCP Contacted on Admission: [ ] Y    [x ] N    3.  PCP contacted at Discharge: [ ] Y    [ ] N    [ ] N/A  4.  Post-Discharge Appointment Date and Location:  5.  Summary of Handoff given to PCP:
1.  Name of PCP: Dr. Bharathi Ramirez in [place]  2.  PCP Contacted on Admission: [ ] Y    [x ] N    3.  PCP contacted at Discharge: [ ] Y    [ ] N    [ ] N/A  4.  Post-Discharge Appointment Date and Location:  5.  Summary of Handoff given to PCP:
1.  Name of PCP: Dr. Bharathi Ramirez in [place]  2.  PCP Contacted on Admission: [ ] Y    [x ] N    3.  PCP contacted at Discharge: [ ] Y    [ ] N    [ ] N/A  4.  Post-Discharge Appointment Date and Location:  5.  Summary of Handoff given to PCP:
1.  Name of PCP: Dr. Bharathi Ramirez in [place]  2.  PCP Contacted on Admission: [ ] Y    [x ] N    3.  PCP contacted at Discharge: [ ] Y    [ ] N    [ ] N/A  4.  Post-Discharge Appointment Date and Location:  5.  Summary of Handoff given to PCP:
1.  Name of PCP: Dr. Bharathi Ramirez in [place]  2.  PCP Contacted on Admission: [ ] Y    [x ] N    3.  PCP contacted at Discharge: [ ] Y    [ ] N    [ ] N/A  4.  Post-Discharge Appointment Date and Location:  5.  Summary of Handoff given to PCP:
1.  Name of PCP: Dr. Bharathi Ramirez in [place]  2.  PCP Contacted on Admission: [ ] Y    [x ] N    3.  PCP contacted at Discharge: [ ] Y    [ ] N    [ ] N/A  4.  Post-Discharge Appointment Date and Location:  5.  Summary of Handoff given to PCP:
1.  Name of PCP: Dr. Bharathi Ramirez in [place]  2.  PCP Contacted on Admission: [ ] Y    [x ] N    3.  PCP contacted at Discharge: [ ] Y    [ ] N    [ ] N/A  4.  Post-Discharge Appointment Date and Location:  5.  Summary of Handoff given to PCP:
1.  Name of PCP: Dr. Bharathi Ramirez in [place]  2.  PCP Contacted on Admission: [ ] Y    [x ] N    3.  PCP contacted at Discharge: [ ] Y    [ ] N    [ ] N/A  4.  Post-Discharge Appointment Date and Location:  5.  Summary of Handoff given to PCP:
1.  Name of PCP: Dr. Bharathi Ramirez in [place]  2.  PCP Contacted on Admission: [ ] Y    [x ] N    3.  PCP contacted at Discharge: [ ] Y    [ ] N    [ ] N/A  4.  Post-Discharge Appointment Date and Location:  5.  Summary of Handoff given to PCP:
2/2 COVID 19 infection   - Plaquenil, steroids, Anakinra, proning, chest PT/IS/albuterol, titrate O2 as able  - full code
due to COVID19 PNA  NO abx  No IVF due to pulmonary issues.
1.  Name of PCP: Dr. Bharathi Ramirez in [place]  2.  PCP Contacted on Admission: [ ] Y    [x ] N    3.  PCP contacted at Discharge: [ ] Y    [ ] N    [ ] N/A  4.  Post-Discharge Appointment Date and Location:  5.  Summary of Handoff given to PCP:

## 2020-04-29 NOTE — DISCHARGE NOTE NURSING/CASE MANAGEMENT/SOCIAL WORK - PATIENT PORTAL LINK FT
You can access the FollowMyHealth Patient Portal offered by [facility] by registering at the following website: [email or web address]. By joining [facility]’s FollowMyHealth portal, you will also be able to view your health information using other applications (apps) compatible with our system.

## 2020-04-29 NOTE — CHART NOTE - NSCHARTNOTEFT_GEN_A_CORE
Patient is in need of oxygen upon discharge from hospital. O2 saturating at rest on room air at 90%; Upon ambulation with physical therapy on room air patient desaturated to 82% on pulse oximetry - Placed 3L NC supplemental oxygen and patient improved on pulse oximetry to 93%. Patient requires oxygen for hypoxia secondary to COVID-19 infection.

## 2020-04-29 NOTE — DISCHARGE NOTE NURSING/CASE MANAGEMENT/SOCIAL WORK - NSDCDMETYPESERV_GEN_ALL_CORE_FT
A  Portable Oxygen Concentrator will be delivered to your Hospital Room to bring Home. This MUST be Returned once no longer needed.

## 2022-05-27 NOTE — PHYSICAL THERAPY INITIAL EVALUATION ADULT - THERAPY FREQUENCY, PT EVAL
Daily Note     Today's date: 2022  Patient name: Isaac Chase  : 1966  MRN: [id number]  Referring provider: Maira Barillas PT  Dx:   Encounter Diagnosis     ICD-10-CM    1  Right sided weakness  R53 1                   Subjective:   "Feeling ok"        Objective: See treatment diary below      Assessment: Tolerated treatment well  Patient exhibited good technique with therapeutic exercises      Plan: Continue per plan of care        Manuals 2022 5/27/22 5/10/22 5/11/22 5/17   Right Shoulder and wrist PROM and stretch                                Neuro Re-Ed         MTP/LTP        Side-Stepping 3x30 3x30  3x30" Airex, 2 laps 3x30"   Tandem walk on airex  On floor 3x8'   1 lap    Stance on foam 3x30 3x30 3x30" 3x30" 3x30'   Stance with EC 3x30 3x30  3x30" 3x30"    On foam 2x30" 3x30"        grapevine  3x8'                                                       Ther Ex        Nustep L2  10  L2   10 L2 10' L2 10' 10 min L2    lunges  3x8'       TR/HR 20 20x 20 20 20x    Standing SLR 3-way 2x10 R 2# / L3# 2x10  2x10 ea R 2#/ L 3# 2x10 R 2# / L3# 2x10    LAQ 2x10 R 2#/ L3# 2x10  2# 3" 2x10 R 2#/ L 3# 2x10 R2#/L3# 2x10   Seated Marches 2x10 R2# / L3#   2x10  2x10 R 2#/ L 3#  2x10 R 2#/ L 3#    Seated HS curls with TB 2x10 GTB 2x10 RTB 2x10 RTB 2x10 GTB 2x10    Sup/Pron Roller        Bicep Curls 2#  2x10  2#  2x10 2# 2x10 2# 2x10 2# cuff wgt 2x10    Wrist AROM        Seated hip abd  GTB 2x10   GTB 2x10    squat  2x10       pulleys 5 5 min  4 min 5 min 5 min    Ther Activity                        Gait Training                        Modalities 3-5x/week

## 2023-05-09 NOTE — CHART NOTE - NSCHARTNOTEFT_GEN_A_CORE
PCP Source: Patient [ ]    Family [ ]     other [X] Medical Chart   Diet rx: Regular     Pt 39 yo male with no PMH; Pt COVID19 PNA with hypoxia - per chart review. Unable to conduct a face to face interview or nutrition-focused physical exam due to limited contact restrictions related to Pt's medical condition and isolation precautions. Collateral obtained from chart review. Of note, Rapid Response Team called on Pt on 4/12. Pt on 100% non-rebreather; Pt encouraged to lay prone throughout shift - per nursing flow sheet note (4/17). Unable to assess PO intake @ present. No report of GI distress (nausea/vomiting/diarrhea/constipation) @ this time. Last BM (4/14) per flow sheet. No report of chewing or swallowing difficulties.     Enteral/Parenteral Nutrition: N/A     Current Weight: 90.1 Kg - 4/9/20   Pertinent Medications: Lovenox, Vit C, Zn-Sulfate   Pertinent Labs:  04-17 Na133 mmol/L<L> Glu 105 mg/dL<H> K+ 4.6 mmol/L Cr  0.61 mg/dL BUN 16 mg/dL 04-17 Phos 2.8 mg/dL 04-17 Alb 2.1 g/dL<L>    Skin: No report of pressure injuries; no report of edema @ present   Estimated Needs:   [X] no change since previous assessment  [ ] recalculated:     Recommendations:  1. Continue PO diet as ordered; Monitor PO diet tolerance; Honor food preferences;   2. Suggest: To obtain btain Pt's height;  3. Monitor labs, weekly weights, hydration status;

## 2025-02-06 NOTE — PHYSICAL THERAPY INITIAL EVALUATION ADULT - IMPAIRMENTS FOUND, PT EVAL
Update History & Physical    The patient's History and Physical of February 5, 2025 was reviewed with the patient and I examined the patient. There was no change. The surgical site was confirmed by the patient and me.     Plan: The risks, benefits, expected outcome, and alternative to the recommended procedure have been discussed with the patient. Patient understands and wants to proceed with the procedure.     Electronically signed by Carmen Yao MD on 2/6/2025 at 8:14 AM      
gait, locomotion, and balance/aerobic capacity/endurance